# Patient Record
Sex: MALE | Race: WHITE | NOT HISPANIC OR LATINO | Employment: OTHER | URBAN - METROPOLITAN AREA
[De-identification: names, ages, dates, MRNs, and addresses within clinical notes are randomized per-mention and may not be internally consistent; named-entity substitution may affect disease eponyms.]

---

## 2017-07-28 ENCOUNTER — LAB (OUTPATIENT)
Dept: LAB | Facility: HOSPITAL | Age: 62
End: 2017-07-28
Attending: SURGERY

## 2017-07-28 ENCOUNTER — HOSPITAL ENCOUNTER (OUTPATIENT)
Dept: CARDIOLOGY | Facility: HOSPITAL | Age: 62
Discharge: HOME | End: 2017-07-28
Attending: SURGERY | Admitting: SURGERY

## 2017-07-28 ENCOUNTER — TRANSCRIBE ORDERS (OUTPATIENT)
Dept: ADMINISTRATIVE | Facility: HOSPITAL | Age: 62
End: 2017-07-28

## 2017-07-28 DIAGNOSIS — Z01.812 PRE-OPERATIVE LABORATORY EXAMINATION: ICD-10-CM

## 2017-07-28 DIAGNOSIS — Z01.818 PREOP EXAMINATION: ICD-10-CM

## 2017-07-28 DIAGNOSIS — Z01.812 PRE-OPERATIVE LABORATORY EXAMINATION: Primary | ICD-10-CM

## 2017-07-28 LAB
BASOPHILS # BLD AUTO: 0.03 10*3/MM3 (ref 0–0.2)
BASOPHILS NFR BLD AUTO: 0.6 % (ref 0–1.5)
DEPRECATED RDW RBC AUTO: 43.1 FL (ref 37–54)
EOSINOPHIL # BLD AUTO: 0.09 10*3/MM3 (ref 0–0.7)
EOSINOPHIL NFR BLD AUTO: 1.9 % (ref 0.3–6.2)
ERYTHROCYTE [DISTWIDTH] IN BLOOD BY AUTOMATED COUNT: 13 % (ref 11.5–14.5)
HCT VFR BLD AUTO: 42.8 % (ref 40.4–52.2)
HGB BLD-MCNC: 14.3 G/DL (ref 13.7–17.6)
IMM GRANULOCYTES # BLD: 0.01 10*3/MM3 (ref 0–0.03)
IMM GRANULOCYTES NFR BLD: 0.2 % (ref 0–0.5)
LYMPHOCYTES # BLD AUTO: 1.06 10*3/MM3 (ref 0.9–4.8)
LYMPHOCYTES NFR BLD AUTO: 22.2 % (ref 19.6–45.3)
MCH RBC QN AUTO: 30.4 PG (ref 27–32.7)
MCHC RBC AUTO-ENTMCNC: 33.4 G/DL (ref 32.6–36.4)
MCV RBC AUTO: 90.9 FL (ref 79.8–96.2)
MONOCYTES # BLD AUTO: 0.35 10*3/MM3 (ref 0.2–1.2)
MONOCYTES NFR BLD AUTO: 7.3 % (ref 5–12)
NEUTROPHILS # BLD AUTO: 3.23 10*3/MM3 (ref 1.9–8.1)
NEUTROPHILS NFR BLD AUTO: 67.8 % (ref 42.7–76)
NRBC BLD MANUAL-RTO: 0 /100 WBC (ref 0–0)
PLATELET # BLD AUTO: 203 10*3/MM3 (ref 140–500)
PMV BLD AUTO: 10.1 FL (ref 6–12)
RBC # BLD AUTO: 4.71 10*6/MM3 (ref 4.6–6)
WBC NRBC COR # BLD: 4.77 10*3/MM3 (ref 4.5–10.7)

## 2017-07-28 PROCEDURE — 93010 ELECTROCARDIOGRAM REPORT: CPT | Performed by: INTERNAL MEDICINE

## 2017-07-28 PROCEDURE — 93005 ELECTROCARDIOGRAM TRACING: CPT | Performed by: SURGERY

## 2017-07-28 PROCEDURE — 85025 COMPLETE CBC W/AUTO DIFF WBC: CPT

## 2017-07-28 PROCEDURE — 36415 COLL VENOUS BLD VENIPUNCTURE: CPT

## 2017-08-04 ENCOUNTER — LAB REQUISITION (OUTPATIENT)
Dept: LAB | Facility: HOSPITAL | Age: 62
End: 2017-08-04

## 2017-08-04 DIAGNOSIS — I87.2 VENOUS INSUFFICIENCY (CHRONIC) (PERIPHERAL): ICD-10-CM

## 2017-08-04 PROCEDURE — 88304 TISSUE EXAM BY PATHOLOGIST: CPT | Performed by: SURGERY

## 2017-08-07 LAB
CYTO UR: NORMAL
LAB AP CASE REPORT: NORMAL
LAB AP CLINICAL INFORMATION: NORMAL
Lab: NORMAL
PATH REPORT.FINAL DX SPEC: NORMAL
PATH REPORT.GROSS SPEC: NORMAL

## 2021-04-12 NOTE — PROGRESS NOTES
"Chief Complaint  Establish Care    Subjective          Colby Santillan presents to [facility] PRIMARY CARE  History of Present Illness     Previous PCP Aida    Very janneth patient with a previous history of colon cancer and at that time changed to a vegan diet.  He had chemo/surgery after stage 3b colon cancer Dx.  Unable to complete chemo series due to overwhelming side effects.  Due to the cancer, he decided to change his diet to a plant-based diet.  Since being on the plant based diet, his blood pressure issues improved immensely and he says that \"I am still here\".      Needs screening labs.  Not fasting today.  No cholesterol panel since 2015 and I do not see a metabolic panel since 2017.    Objective   Vital Signs:   /78 (BP Location: Left arm, Patient Position: Sitting, Cuff Size: Adult)   Pulse 60   Temp 98.4 °F (36.9 °C) (Temporal)   Resp 16   Ht 190.5 cm (75\")   Wt 81.3 kg (179 lb 3.2 oz)   SpO2 99%   BMI 22.40 kg/m²     Physical Exam  Vitals and nursing note reviewed.   Constitutional:       General: He is not in acute distress.     Appearance: Normal appearance.   Cardiovascular:      Rate and Rhythm: Normal rate and regular rhythm.      Heart sounds: Normal heart sounds.   Pulmonary:      Effort: Pulmonary effort is normal.      Breath sounds: Normal breath sounds.   Neurological:      Mental Status: He is alert.        Result Review :                 Assessment and Plan    Diagnoses and all orders for this visit:    1. Encounter to establish care with new doctor (Primary)  -     CBC (No Diff); Future  -     Comprehensive Metabolic Panel; Future  -     Lipid Panel; Future    2. History of colon cancer, stage III    3. Screening for deficiency anemia  -     CBC (No Diff); Future    4. Screening for diabetes mellitus  -     Comprehensive Metabolic Panel; Future    5. Screening for lipid disorders  -     Lipid Panel; Future      I have ordered him to complete some screening " labs for me and if everything is okay then I will plan to see him back in about 1 year unless something else comes up.    I spent 30 minutes caring for Colby on this date of service. This time includes time spent by me in the following activities:preparing for the visit, reviewing tests, obtaining and/or reviewing a separately obtained history, performing a medically appropriate examination and/or evaluation , counseling and educating the patient/family/caregiver and documenting information in the medical record  Follow Up   Return in about 1 year (around 4/14/2022) for Follow-up.  Patient was given instructions and counseling regarding his condition or for health maintenance advice. Please see specific information pulled into the AVS if appropriate.

## 2021-04-13 ENCOUNTER — OFFICE VISIT (OUTPATIENT)
Dept: INTERNAL MEDICINE | Facility: CLINIC | Age: 66
End: 2021-04-13

## 2021-04-13 VITALS
SYSTOLIC BLOOD PRESSURE: 123 MMHG | DIASTOLIC BLOOD PRESSURE: 78 MMHG | WEIGHT: 179.2 LBS | HEIGHT: 75 IN | OXYGEN SATURATION: 99 % | TEMPERATURE: 98.4 F | HEART RATE: 60 BPM | BODY MASS INDEX: 22.28 KG/M2 | RESPIRATION RATE: 16 BRPM

## 2021-04-13 DIAGNOSIS — Z13.220 SCREENING FOR LIPID DISORDERS: ICD-10-CM

## 2021-04-13 DIAGNOSIS — Z85.038 HISTORY OF COLON CANCER, STAGE III: ICD-10-CM

## 2021-04-13 DIAGNOSIS — Z76.89 ENCOUNTER TO ESTABLISH CARE WITH NEW DOCTOR: Primary | ICD-10-CM

## 2021-04-13 DIAGNOSIS — Z13.0 SCREENING FOR DEFICIENCY ANEMIA: ICD-10-CM

## 2021-04-13 DIAGNOSIS — Z13.1 SCREENING FOR DIABETES MELLITUS: ICD-10-CM

## 2021-04-13 PROBLEM — L73.9 FOLLICULITIS: Status: ACTIVE | Noted: 2021-04-13

## 2021-04-13 PROBLEM — H90.0 CONDUCTIVE HEARING LOSS OF BOTH EARS: Status: ACTIVE | Noted: 2021-04-13

## 2021-04-13 PROCEDURE — 99203 OFFICE O/P NEW LOW 30 MIN: CPT | Performed by: FAMILY MEDICINE

## 2021-04-13 NOTE — PATIENT INSTRUCTIONS
Health Maintenance, Male  Adopting a healthy lifestyle and getting preventive care are important in promoting health and wellness. Ask your health care provider about:  · The right schedule for you to have regular tests and exams.  · Things you can do on your own to prevent diseases and keep yourself healthy.  What should I know about diet, weight, and exercise?  Eat a healthy diet    · Eat a diet that includes plenty of vegetables, fruits, low-fat dairy products, and lean protein.  · Do not eat a lot of foods that are high in solid fats, added sugars, or sodium.  Maintain a healthy weight  Body mass index (BMI) is a measurement that can be used to identify possible weight problems. It estimates body fat based on height and weight. Your health care provider can help determine your BMI and help you achieve or maintain a healthy weight.  Get regular exercise  Get regular exercise. This is one of the most important things you can do for your health. Most adults should:  · Exercise for at least 150 minutes each week. The exercise should increase your heart rate and make you sweat (moderate-intensity exercise).  · Do strengthening exercises at least twice a week. This is in addition to the moderate-intensity exercise.  · Spend less time sitting. Even light physical activity can be beneficial.  Watch cholesterol and blood lipids  Have your blood tested for lipids and cholesterol at 20 years of age, then have this test every 5 years.  You may need to have your cholesterol levels checked more often if:  · Your lipid or cholesterol levels are high.  · You are older than 40 years of age.  · You are at high risk for heart disease.  What should I know about cancer screening?  Many types of cancers can be detected early and may often be prevented. Depending on your health history and family history, you may need to have cancer screening at various ages. This may include screening for:  · Colorectal cancer.  · Prostate  cancer.  · Skin cancer.  · Lung cancer.  What should I know about heart disease, diabetes, and high blood pressure?  Blood pressure and heart disease  · High blood pressure causes heart disease and increases the risk of stroke. This is more likely to develop in people who have high blood pressure readings, are of  descent, or are overweight.  · Talk with your health care provider about your target blood pressure readings.  · Have your blood pressure checked:  ? Every 3-5 years if you are 18-39 years of age.  ? Every year if you are 40 years old or older.  · If you are between the ages of 65 and 75 and are a current or former smoker, ask your health care provider if you should have a one-time screening for abdominal aortic aneurysm (AAA).  Diabetes  Have regular diabetes screenings. This checks your fasting blood sugar level. Have the screening done:  · Once every three years after age 45 if you are at a normal weight and have a low risk for diabetes.  · More often and at a younger age if you are overweight or have a high risk for diabetes.  What should I know about preventing infection?  Hepatitis B  If you have a higher risk for hepatitis B, you should be screened for this virus. Talk with your health care provider to find out if you are at risk for hepatitis B infection.  Hepatitis C  Blood testing is recommended for:  · Everyone born from 1945 through 1965.  · Anyone with known risk factors for hepatitis C.  Sexually transmitted infections (STIs)  · You should be screened each year for STIs, including gonorrhea and chlamydia, if:  ? You are sexually active and are younger than 24 years of age.  ? You are older than 24 years of age and your health care provider tells you that you are at risk for this type of infection.  ? Your sexual activity has changed since you were last screened, and you are at increased risk for chlamydia or gonorrhea. Ask your health care provider if you are at risk.  · Ask your  health care provider about whether you are at high risk for HIV. Your health care provider may recommend a prescription medicine to help prevent HIV infection. If you choose to take medicine to prevent HIV, you should first get tested for HIV. You should then be tested every 3 months for as long as you are taking the medicine.  Follow these instructions at home:  Lifestyle  · Do not use any products that contain nicotine or tobacco, such as cigarettes, e-cigarettes, and chewing tobacco. If you need help quitting, ask your health care provider.  · Do not use street drugs.  · Do not share needles.  · Ask your health care provider for help if you need support or information about quitting drugs.  Alcohol use  · Do not drink alcohol if your health care provider tells you not to drink.  · If you drink alcohol:  ? Limit how much you have to 0-2 drinks a day.  ? Be aware of how much alcohol is in your drink. In the [place] one drink equals one 12 oz bottle of beer (355 mL), one 5 oz glass of wine (148 mL), or one 1½ oz glass of hard liquor (44 mL).  General instructions  · Schedule regular health, dental, and eye exams.  · Stay current with your vaccines.  · Tell your health care provider if:  ? You often feel depressed.  ? You have ever been abused or do not feel safe at home.  Summary  · Adopting a healthy lifestyle and getting preventive care are important in promoting health and wellness.  · Follow your health care provider's instructions about healthy diet, exercising, and getting tested or screened for diseases.  · Follow your health care provider's instructions on monitoring your cholesterol and blood pressure.  This information is not intended to replace advice given to you by your health care provider. Make sure you discuss any questions you have with your health care provider.  Document Revised: 12/11/2019 Document Reviewed: 12/11/2019  Elsevier Patient Education © 2021 [employer]

## 2021-04-19 ENCOUNTER — TELEPHONE (OUTPATIENT)
Dept: INTERNAL MEDICINE | Facility: CLINIC | Age: 66
End: 2021-04-19

## 2021-04-19 NOTE — TELEPHONE ENCOUNTER
Caller: Colby Santillan    Relationship to patient: Self    Best call back number: [phone]    Patient is needing: PATIENT NEEDS TO RESCHEDULE LAB WORK. UNABLE TO WARM TRANSFER.

## 2022-03-22 ENCOUNTER — TELEPHONE (OUTPATIENT)
Dept: INTERNAL MEDICINE | Facility: CLINIC | Age: 67
End: 2022-03-22

## 2022-03-22 NOTE — TELEPHONE ENCOUNTER
Caller: Colby Santillan    Relationship: Self    Best call back number: [phone]     What is the medical concern/diagnosis: EARLY ONSET PARKINSON'S     What specialty or service is being requested: NEUROTRANSMITTER THERAPY    Any additional details: PATIENT WANTED TO KNOW WHO DR. RODRIGUEZ WOULD RECOMMEND FOR NEUROTRANSMITTER THERAPY - HE BELIEVES HE MAY HAVE EARLY ONSET OF PARKINSON'S AND HE WANTED TO SEE WHO HE CAN CONTACT.

## 2022-03-22 NOTE — TELEPHONE ENCOUNTER
I have only seen him one time in April 2021.  We did not discuss anything about this issue.  He would have to be seen to discuss this matter before any referrals can be made or advice given.

## 2022-03-26 NOTE — PROGRESS NOTES
"Chief Complaint  Annual Exam    Subjective            Colby Santillan presents to [facility] PRIMARY CARE  History of Present Illness    CPE- Patient reporting that health is doing well overall.  Patient is here today for annual physical.  Eating a balanced diet.  Wears hearing aids for bilateral hearing loss.    Labs: Due for routine labs    Colorectal cancer screening: UTD    Additional complaints:  He is concerned about having some tremors and he will have a fine tremor especially when performing an action.  It is in both hands.  He will also see it sometimes in lip as well.  Trying to relax seems to help.  Stress tends to make it worse.  His brother also tremors as well and father.    He has noticed some urinary frequency as well.  He has tried to avoid drinking fluids too close to bedtime.  Denies alcohol use.  Gets up about twice per night and rarely more than that. Having some broken stream but not straining too much.  He has had prostate exams before which have not been abnormal.    Review of Systems      Objective   Vital Signs:   /78 (BP Location: Left arm, Patient Position: Sitting, Cuff Size: Adult)   Pulse 60   Temp 99.1 °F (37.3 °C) (Temporal)   Ht 190.5 cm (75\")   Wt 80.6 kg (177 lb 12.8 oz)   SpO2 99%   BMI 22.22 kg/m²     Physical Exam  Vitals and nursing note reviewed.   Constitutional:       General: He is not in acute distress.     Appearance: Normal appearance.   HENT:      Right Ear: Tympanic membrane, ear canal and external ear normal.      Left Ear: Tympanic membrane, ear canal and external ear normal.      Nose: Nose normal.      Mouth/Throat:      Mouth: Mucous membranes are moist.   Eyes:      General:         Right eye: No discharge.         Left eye: No discharge.      Conjunctiva/sclera: Conjunctivae normal.   Cardiovascular:      Rate and Rhythm: Normal rate and regular rhythm.      Pulses: Normal pulses.      Heart sounds: Normal heart sounds.   Pulmonary: "      Effort: Pulmonary effort is normal.      Breath sounds: Normal breath sounds.   Abdominal:      General: Bowel sounds are normal. There is no distension.      Palpations: Abdomen is soft.      Tenderness: There is no abdominal tenderness.   Musculoskeletal:      Cervical back: Neck supple.      Right lower leg: No edema.      Left lower leg: No edema.   Lymphadenopathy:      Cervical: No cervical adenopathy.   Skin:     General: Skin is warm.      Findings: No rash.   Neurological:      General: No focal deficit present.      Mental Status: He is alert. Mental status is at baseline.      Cranial Nerves: Cranial nerves are intact.      Sensory: Sensation is intact.      Motor: Motor function is intact.      Gait: Gait is intact.      Comments: Fine tremor noticed with outstretched hands and slow retraction of the tongue into the mouth   Psychiatric:         Mood and Affect: Mood normal.         Behavior: Behavior normal.        Result Review :   The following data was reviewed by: Nikhil Salvador MD on 03/28/2022:  Common labs    Common Labsle 4/23/21 4/23/21 4/23/21    0918 0918 0918   Glucose  85    BUN  10    Creatinine  0.71 (A)    eGFR Non  Am  112    eGFR African Am  135    Sodium  139    Potassium  4.5    Chloride  103    Calcium  9.9    Total Protein  6.7    Albumin  4.40    Total Bilirubin  0.7    Alkaline Phosphatase  83    AST (SGOT)  15    ALT (SGPT)  13    WBC 4.79     Hemoglobin 14.2     Hematocrit 42.2     Platelets 192     Total Cholesterol   163   Triglycerides   91   HDL Cholesterol   52   LDL Cholesterol    94   (A) Abnormal value       Comments are available for some flowsheets but are not being displayed.                     Assessment and Plan    Diagnoses and all orders for this visit:    1. Encounter for health maintenance examination (Primary)  -     CBC (No Diff)  -     Comprehensive Metabolic Panel  -     Lipid Panel With LDL / HDL Ratio  -     PSA Screen  -     TSH Rfx On  Abnormal To Free T4    2. Screening for deficiency anemia  -     CBC (No Diff)    3. Screening for diabetes mellitus  -     Comprehensive Metabolic Panel    4. Screening for lipid disorders  -     Lipid Panel With LDL / HDL Ratio    5. Screening for prostate cancer  -     PSA Screen    6. Screening for thyroid disorder  -     TSH Rfx On Abnormal To Free T4    7. Benign essential tremor    8. Urinary frequency  -     tamsulosin (FLOMAX) 0.4 MG capsule 24 hr capsule; Take 1 capsule by mouth Daily.  Dispense: 30 capsule; Refill: 2      Additional codes:  Chronic issues but new to me  Essential tremor - will monitor for now.  No medication necessary at this time as this tremor is not adversely affecting his ability in his daily routine.  Decrease stimulant use and deep breathing.  We can always revisit medication in the future.    Urinary frequency - History consistent with BPH and will get PSA today.  Start tamsulosin 0.4mg nightly.  Father has a hx of prostate cancer.              The patient was counseled regarding nutrition, physical activity, healthy weight, injury prevention, misuse of tobacco, alcohol and illicit drugs, mental health, immunizations, and screenings.    Recommended to check with pharmacy regarding newer pneumo vaccines.  Declines flu vaccines so d/c'd them.  Will hold on other vaccines as I asked him to investigate getting the newer pneumo vaccines.    Follow Up   Return in about 1 year (around 3/30/2023) for Annual physical.  Patient was given instructions and counseling regarding his condition or for health maintenance advice. Please see specific information pulled into the AVS if appropriate.

## 2022-03-28 ENCOUNTER — OFFICE VISIT (OUTPATIENT)
Dept: INTERNAL MEDICINE | Facility: CLINIC | Age: 67
End: 2022-03-28

## 2022-03-28 VITALS
HEIGHT: 75 IN | TEMPERATURE: 99.1 F | OXYGEN SATURATION: 99 % | WEIGHT: 177.8 LBS | HEART RATE: 60 BPM | SYSTOLIC BLOOD PRESSURE: 128 MMHG | BODY MASS INDEX: 22.11 KG/M2 | DIASTOLIC BLOOD PRESSURE: 78 MMHG

## 2022-03-28 DIAGNOSIS — Z13.220 SCREENING FOR LIPID DISORDERS: ICD-10-CM

## 2022-03-28 DIAGNOSIS — G25.0 BENIGN ESSENTIAL TREMOR: ICD-10-CM

## 2022-03-28 DIAGNOSIS — R35.0 URINARY FREQUENCY: ICD-10-CM

## 2022-03-28 DIAGNOSIS — Z13.0 SCREENING FOR DEFICIENCY ANEMIA: ICD-10-CM

## 2022-03-28 DIAGNOSIS — Z00.00 ENCOUNTER FOR HEALTH MAINTENANCE EXAMINATION: Primary | ICD-10-CM

## 2022-03-28 DIAGNOSIS — Z12.5 SCREENING FOR PROSTATE CANCER: ICD-10-CM

## 2022-03-28 DIAGNOSIS — Z13.29 SCREENING FOR THYROID DISORDER: ICD-10-CM

## 2022-03-28 DIAGNOSIS — Z13.1 SCREENING FOR DIABETES MELLITUS: ICD-10-CM

## 2022-03-28 PROCEDURE — 99397 PER PM REEVAL EST PAT 65+ YR: CPT | Performed by: FAMILY MEDICINE

## 2022-03-28 PROCEDURE — 99214 OFFICE O/P EST MOD 30 MIN: CPT | Performed by: FAMILY MEDICINE

## 2022-03-28 RX ORDER — TAMSULOSIN HYDROCHLORIDE 0.4 MG/1
1 CAPSULE ORAL DAILY
Qty: 30 CAPSULE | Refills: 2 | Status: SHIPPED | OUTPATIENT
Start: 2022-03-28 | End: 2023-01-09

## 2022-03-30 ENCOUNTER — TELEPHONE (OUTPATIENT)
Dept: INTERNAL MEDICINE | Facility: CLINIC | Age: 67
End: 2022-03-30

## 2022-03-30 NOTE — TELEPHONE ENCOUNTER
Caller: Colby Santillan    Relationship to patient: Self    Best call back number: [phone]    Chief complaint:     Type of visit: LAB     Requested date:     If rescheduling, when is the original appointment: 3-30-22     Additional notes:

## 2022-04-05 LAB
ALBUMIN SERPL-MCNC: 4.3 G/DL (ref 3.8–4.8)
ALBUMIN/GLOB SERPL: 2 {RATIO} (ref 1.2–2.2)
ALP SERPL-CCNC: 105 IU/L (ref 44–121)
ALT SERPL-CCNC: 11 IU/L (ref 0–44)
AST SERPL-CCNC: 19 IU/L (ref 0–40)
BILIRUB SERPL-MCNC: 0.8 MG/DL (ref 0–1.2)
BUN SERPL-MCNC: 10 MG/DL (ref 8–27)
BUN/CREAT SERPL: 14 (ref 10–24)
CALCIUM SERPL-MCNC: 9.6 MG/DL (ref 8.6–10.2)
CHLORIDE SERPL-SCNC: 103 MMOL/L (ref 96–106)
CHOLEST SERPL-MCNC: 147 MG/DL (ref 100–199)
CO2 SERPL-SCNC: 25 MMOL/L (ref 20–29)
CREAT SERPL-MCNC: 0.72 MG/DL (ref 0.76–1.27)
EGFRCR SERPLBLD CKD-EPI 2021: 101 ML/MIN/1.73
ERYTHROCYTE [DISTWIDTH] IN BLOOD BY AUTOMATED COUNT: 12.4 % (ref 11.6–15.4)
GLOBULIN SER CALC-MCNC: 2.1 G/DL (ref 1.5–4.5)
GLUCOSE SERPL-MCNC: 86 MG/DL (ref 65–99)
HCT VFR BLD AUTO: 41 % (ref 37.5–51)
HDLC SERPL-MCNC: 50 MG/DL
HGB BLD-MCNC: 13.6 G/DL (ref 13–17.7)
LDLC SERPL CALC-MCNC: 86 MG/DL (ref 0–99)
LDLC/HDLC SERPL: 1.7 RATIO (ref 0–3.6)
MCH RBC QN AUTO: 30.6 PG (ref 26.6–33)
MCHC RBC AUTO-ENTMCNC: 33.2 G/DL (ref 31.5–35.7)
MCV RBC AUTO: 92 FL (ref 79–97)
PLATELET # BLD AUTO: 208 X10E3/UL (ref 150–450)
POTASSIUM SERPL-SCNC: 4.5 MMOL/L (ref 3.5–5.2)
PROT SERPL-MCNC: 6.4 G/DL (ref 6–8.5)
PSA SERPL-MCNC: 2.8 NG/ML (ref 0–4)
RBC # BLD AUTO: 4.44 X10E6/UL (ref 4.14–5.8)
SODIUM SERPL-SCNC: 139 MMOL/L (ref 134–144)
TRIGL SERPL-MCNC: 48 MG/DL (ref 0–149)
TSH SERPL DL<=0.005 MIU/L-ACNC: 1.96 UIU/ML (ref 0.45–4.5)
VLDLC SERPL CALC-MCNC: 11 MG/DL (ref 5–40)
WBC # BLD AUTO: 4.8 X10E3/UL (ref 3.4–10.8)

## 2022-07-09 ENCOUNTER — DOCUMENTATION (OUTPATIENT)
Dept: INTERNAL MEDICINE | Facility: CLINIC | Age: 67
End: 2022-07-09

## 2022-07-09 NOTE — PROGRESS NOTES
Patient followed over the weekend and stated that he had an embedded tick that he just found and removed. He did state there was redness around the area and a blackened area in the middle. Patient denies any myalgias, fever, chills at this time. Patient was instructed to go to the urgent care now for further evaluation and treatment.

## 2022-09-21 ENCOUNTER — TELEPHONE (OUTPATIENT)
Dept: INTERNAL MEDICINE | Facility: CLINIC | Age: 67
End: 2022-09-21

## 2022-09-21 NOTE — TELEPHONE ENCOUNTER
Caller: Colby Santillan    Relationship: Self    Best call back number:[phone]    What is the medical concern/diagnosis:    What specialty or service is being requested: URIOLOGY    What is the provider, practice or medical service name:     What is the office location:     What is the office phone number:     Any additional details: PLEASE CALL AND ADVISE

## 2022-10-03 NOTE — TELEPHONE ENCOUNTER
Caller: Colby Santillan    Relationship: Self    Best call back number: [phone]    What was the call regarding: PATIENT STATES THAT HIS INSURANCE DOES NOT REQUIRE A REFERRAL, AND HE WOULD SIMPLY LIKE THE RECOMMENDATION OF AN UROLOGIST THAT DR RODRIGUEZ SUGGESTS    Do you require a callback: YES

## 2023-01-09 ENCOUNTER — OFFICE VISIT (OUTPATIENT)
Dept: INTERNAL MEDICINE | Facility: CLINIC | Age: 68
End: 2023-01-09
Payer: COMMERCIAL

## 2023-01-09 VITALS
SYSTOLIC BLOOD PRESSURE: 110 MMHG | BODY MASS INDEX: 22.01 KG/M2 | TEMPERATURE: 97.6 F | HEART RATE: 68 BPM | OXYGEN SATURATION: 95 % | HEIGHT: 75 IN | RESPIRATION RATE: 18 BRPM | DIASTOLIC BLOOD PRESSURE: 60 MMHG | WEIGHT: 177 LBS

## 2023-01-09 DIAGNOSIS — Z00.00 WELCOME TO MEDICARE PREVENTIVE VISIT: Primary | ICD-10-CM

## 2023-01-09 PROBLEM — Z85.828 HISTORY OF SKIN CANCER: Chronic | Status: ACTIVE | Noted: 2023-01-09

## 2023-01-09 PROCEDURE — 1125F AMNT PAIN NOTED PAIN PRSNT: CPT | Performed by: FAMILY MEDICINE

## 2023-01-09 PROCEDURE — 1170F FXNL STATUS ASSESSED: CPT | Performed by: FAMILY MEDICINE

## 2023-01-09 PROCEDURE — 1126F AMNT PAIN NOTED NONE PRSNT: CPT | Performed by: FAMILY MEDICINE

## 2023-01-09 PROCEDURE — 1160F RVW MEDS BY RX/DR IN RCRD: CPT | Performed by: FAMILY MEDICINE

## 2023-01-09 PROCEDURE — G0403 EKG FOR INITIAL PREVENT EXAM: HCPCS | Performed by: FAMILY MEDICINE

## 2023-01-09 PROCEDURE — G0402 INITIAL PREVENTIVE EXAM: HCPCS | Performed by: FAMILY MEDICINE

## 2023-01-09 PROCEDURE — 1159F MED LIST DOCD IN RCRD: CPT | Performed by: FAMILY MEDICINE

## 2023-01-09 NOTE — PROGRESS NOTES
The ABCs of the Annual Wellness Visit  [facility]    Subjective     Colby Santillan is a 67 y.o. male who presents for a  Welcome to Medicare Visit.    The following portions of the patient's history were reviewed and   updated as appropriate: allergies, current medications, past family history, past medical history, past social history, past surgical history and problem list.     Compared to one year ago, the patient feels his physical   health is the same.    Compared to one year ago, the patient feels his mental   health is the same.    Recent Hospitalizations:  He was not admitted to the hospital during the last year.       Current Medical Providers:  Patient Care Team:  Nikhil Salvador MD as PCP - General (Family Medicine)    Outpatient Medications Prior to Visit   Medication Sig Dispense Refill   • doxycycline (VIBRAMYCIN) 100 MG capsule 2 po now 2 capsule 0   • tamsulosin (FLOMAX) 0.4 MG capsule 24 hr capsule Take 1 capsule by mouth Daily. 30 capsule 2     No facility-administered medications prior to visit.       No opioid medication identified on active medication list. I have reviewed chart for other potential  high risk medication/s and harmful drug interactions in the elderly.          Aspirin is not on active medication list.  Aspirin use is not indicated based on review of current medical condition/s. Risk of harm outweighs potential benefits.  .    Patient Active Problem List   Diagnosis   • Conductive hearing loss of both ears   • Folliculitis   • History of colon cancer, stage III   • Urinary frequency   • Benign essential tremor   • History of skin cancer     Advance Care Planning  Advance Directive is not on file.  ACP discussion was held with the patient during this visit. Patient has an advance directive (not in EMR), copy requested.       Objective   Vitals:    01/09/23 1338   BP: 110/60   BP Location: Left arm   Patient Position: Sitting   Cuff Size: Small Adult   Pulse: 68    Resp: 18   Temp: 97.6 °F (36.4 °C)   SpO2: 95%   Weight: 80.3 kg (177 lb)   Height: 190.5 cm (75\")   PainSc: 0-No pain     Estimated body mass index is 22.12 kg/m² as calculated from the following:    Height as of this encounter: 190.5 cm (75\").    Weight as of this encounter: 80.3 kg (177 lb).    BMI is within normal parameters. No other follow-up for BMI required.    Physical Exam  Vitals and nursing note reviewed.   Constitutional:       General: He is not in acute distress.     Appearance: Normal appearance.   Cardiovascular:      Rate and Rhythm: Normal rate and regular rhythm.      Heart sounds: Normal heart sounds. No murmur heard.  Pulmonary:      Effort: Pulmonary effort is normal.      Breath sounds: Normal breath sounds.   Neurological:      Mental Status: He is alert.           Does the patient have evidence of cognitive impairment?   No         Procedures       HEALTH RISK ASSESSMENT    Smoking Status:  Social History     Tobacco Use   Smoking Status Never   Smokeless Tobacco Never     Alcohol Consumption:  Social History     Substance and Sexual Activity   Alcohol Use Yes   • Alcohol/week: 1.0 standard drink   • Types: 1 Cans of beer per week       Fall Risk Screen:    [facility] Fall Risk Assessment was completed, and patient is at LOW risk for falls.Assessment completed on:1/9/2023    Depression Screen:   PHQ-2/PHQ-9 Depression Screening 1/9/2023   Little Interest or Pleasure in Doing Things 0-->not at all   Feeling Down, Depressed or Hopeless 0-->not at all   PHQ-9: Brief Depression Severity Measure Score 0       Health Habits and Functional and Cognitive Screening:  Functional & Cognitive Status 1/9/2023   Do you have difficulty preparing food and eating? No   Do you have difficulty bathing yourself, getting dressed or grooming yourself? No   Do you have difficulty using the toilet? No   Do you have difficulty moving around from place to place? No   Do you have trouble with steps or getting out of a  bed or a chair? No   Current Diet Well Balanced Diet   Dental Exam Up to date   Eye Exam Not up to date   Exercise (times per week) 4 times per week   Current Exercises Include Walking;Weightlifting   Do you need help using the phone?  No   Are you deaf or do you have serious difficulty hearing?  Yes   Do you need help with transportation? No   Do you need help shopping? No   Do you need help preparing meals?  No   Do you need help with housework?  No   Do you need help with laundry? No   Do you need help taking your medications? No   Do you need help managing money? No   Do you ever drive or ride in a car without wearing a seat belt? No   Have you felt unusual stress, anger or loneliness in the last month? No   Who do you live with? Spouse   If you need help, do you have trouble finding someone available to you? No   Have you been bothered in the last four weeks by sexual problems? No   Do you have difficulty concentrating, remembering or making decisions? No       Visual Acuity:    No results found.   He has an eye doctor appointment in a couple months so deferred for today.    Age-appropriate Screening Schedule:  Refer to the list below for future screening recommendations based on patient's age, sex and/or medical conditions. Orders for these recommended tests are listed in the plan section. The patient has been provided with a written plan.    Health Maintenance   Topic Date Due   • ZOSTER VACCINE (1 of 2) Never done   • TDAP/TD VACCINES (2 - Tdap) 07/09/2032   • INFLUENZA VACCINE  Discontinued          Common labs    Common Labs 4/4/22 4/4/22 4/4/22 4/4/22    0902 0902 0902 0902   Glucose  86     BUN  10     Creatinine  0.72 (A)     Sodium  139     Potassium  4.5     Chloride  103     Calcium  9.6     Total Protein  6.4     Albumin  4.3     Total Bilirubin  0.8     Alkaline Phosphatase  105     AST (SGOT)  19     ALT (SGPT)  11     WBC 4.8      Hemoglobin 13.6      Hematocrit 41.0      Platelets 208      Total  Cholesterol   147    Triglycerides   48    HDL Cholesterol   50    LDL Cholesterol    86    PSA    2.8   (A) Abnormal value       Comments are available for some flowsheets but are not being displayed.              Adult ECG Report     Name: Colby Santillan   Age: 67 y.o.   Gender: male    I personally interpreted this EKG and compared to the one from July 28, 2017.     Rate: 60   Rhythm: normal sinus rhythm   QRS Axis: Normal   MS Interval: 160   QRS Duration: 90   QTc: 396   Voltages: 10   Conduction Disturbances: none   Other Abnormalities: none     Narrative Interpretation: Normal sinus rhythm, normal ECG        CMS Preventative Services Quick Reference  Risk Factors Identified During Encounter    Immunizations Discussed/Encouraged: Influenza, Prevnar 20 (Pneumococcal 20-valent conjugate), Shingrix and COVID19  The above risks/problems have been discussed with the patient.  Pertinent information has been shared with the patient in the After Visit Summary.  Follow up plans and orders are seen below in the Assessment/Plan Section.    Diagnoses and all orders for this visit:    1. Welcome to Medicare preventive visit (Primary)  -     ECG 12 Lead        Follow Up:   Return in about 3 months (around 4/20/2023) for Annual physical.      An After Visit Summary and PPPS were made available to the patient.

## 2023-02-02 ENCOUNTER — TELEPHONE (OUTPATIENT)
Dept: INTERNAL MEDICINE | Facility: CLINIC | Age: 68
End: 2023-02-02

## 2023-02-02 DIAGNOSIS — R35.0 BENIGN PROSTATIC HYPERPLASIA WITH URINARY FREQUENCY: ICD-10-CM

## 2023-02-02 DIAGNOSIS — N40.1 BENIGN PROSTATIC HYPERPLASIA WITH URINARY FREQUENCY: ICD-10-CM

## 2023-02-02 DIAGNOSIS — R35.0 URINARY FREQUENCY: Primary | ICD-10-CM

## 2023-02-02 NOTE — TELEPHONE ENCOUNTER
Caller: Colby Santillan    Relationship: Self    Best call back number: [phone]    What is the medical concern/diagnosis: ENLARGED PROSTATE    What specialty or service is being requested: UROLOGY    What is the provider, practice or medical service name: DR. TEA GANDHI, [facility] UROLOGY    Any additional details: PLEASE ADVISE.

## 2023-03-31 ENCOUNTER — OFFICE VISIT (OUTPATIENT)
Dept: INTERNAL MEDICINE | Facility: CLINIC | Age: 68
End: 2023-03-31
Payer: MEDICARE

## 2023-03-31 VITALS
TEMPERATURE: 97.5 F | SYSTOLIC BLOOD PRESSURE: 120 MMHG | HEART RATE: 72 BPM | HEIGHT: 75 IN | WEIGHT: 180 LBS | OXYGEN SATURATION: 96 % | DIASTOLIC BLOOD PRESSURE: 74 MMHG | BODY MASS INDEX: 22.38 KG/M2

## 2023-03-31 DIAGNOSIS — R25.1 TREMOR: Primary | ICD-10-CM

## 2023-03-31 DIAGNOSIS — R19.4 CHANGE IN BOWEL HABIT: Primary | ICD-10-CM

## 2023-03-31 DIAGNOSIS — R19.4 CHANGE IN BOWEL HABIT: ICD-10-CM

## 2023-03-31 NOTE — PROGRESS NOTES
"Chief Complaint  Tremors (Face & Body ) and Hard Stool     Subjective        Colby Santillan presents to [facility] PRIMARY [facility]ARE  History of Present Illness  This is a 68 y/o male presenting to office for complaints of tremor in left hand, constipation. Patient reports over the past year, he has also experienced some facial tremors-- reports he is not sure if it's visible but he does feel it. Patient reports he has noticed his left hand has gotten progressively worse. Patient reports he has been experiencing some tremors in other muscular regions including his chest. Patient reports over the past year, he has experienced some change in stools. Patient reports he has always followed a plant based diet and never had problems with going to the bathroom. Patient is now experiencing a lot of difficulty with constipation. Patient reports that his tremor in his left hand is intentional. Does not notice much tremor in right hand. Reports he stopped drinking caffeine about 2 weeks ago. Patient reports very little alcohol use-- reports 1 rare beer a week. Patient denies any recent high stressors besides worrying about his current situation.     Objective   Vital Signs:  /74 (BP Location: Right arm, Patient Position: Sitting, Cuff Size: Adult)   Pulse 72   Temp 97.5 °F (36.4 °C) (Infrared)   Ht 190.5 cm (75\")   Wt 81.6 kg (180 lb)   SpO2 96%   BMI 22.50 kg/m²   Estimated body mass index is 22.5 kg/m² as calculated from the following:    Height as of this encounter: 190.5 cm (75\").    Weight as of this encounter: 81.6 kg (180 lb).       BMI is within normal parameters. No other follow-up for BMI required.      Physical Exam  Constitutional:       Appearance: Normal appearance.   HENT:      Head: Normocephalic and atraumatic.      Right Ear: External ear normal.      Left Ear: External ear normal.   Eyes:      Conjunctiva/sclera: Conjunctivae normal.      Pupils: Pupils are equal, round, and reactive " to light.   Cardiovascular:      Rate and Rhythm: Normal rate and regular rhythm.      Pulses: Normal pulses.      Heart sounds: Normal heart sounds. No murmur heard.    No friction rub. No gallop.   Pulmonary:      Effort: Pulmonary effort is normal. No respiratory distress.      Breath sounds: Normal breath sounds. No stridor. No wheezing, rhonchi or rales.   Musculoskeletal:      Cervical back: Normal range of motion and neck supple.   Skin:     General: Skin is warm and dry.   Neurological:      Mental Status: He is alert and oriented to person, place, and time. Mental status is at baseline.      Motor: Tremor present.      Gait: Gait normal.   Psychiatric:         Mood and Affect: Mood normal.         Thought Content: Thought content normal.        Result Review :  The following data was reviewed by: JAMES Romero on 03/31/2023:                   Assessment and Plan   Diagnoses and all orders for this visit:    1. Tremor (Primary)  Assessment & Plan:  Labs today.   Referral placed to neurology.     Orders:  -     TSH  -     T4, free  -     T3, free  -     Hemoglobin A1c  -     Vitamin B12  -     Ambulatory Referral to Neurology  -     Comprehensive metabolic panel  -     Lyme Disease Total Antibody With Reflex to Immunoassay    2. Change in bowel habit  Assessment & Plan:  Due to hx of colon cancer-- referral placed to GI services.   Patient has hx of colon cancer with surgical resection    Orders:  -     Ambulatory Referral to Gastroenterology         I spent 30 minutes caring for Colby on this date of service. This time includes time spent by me in the following activities:preparing for the visit, reviewing tests, obtaining and/or reviewing a separately obtained history, performing a medically appropriate examination and/or evaluation , counseling and educating the patient/family/caregiver, ordering medications, tests, or procedures, documenting information in the medical record and care  coordination  Follow Up   Return if symptoms worsen or fail to improve, for Next scheduled follow up 6/26/23.  Patient was given instructions and counseling regarding his condition or for health maintenance advice. Please see specific information pulled into the AVS if appropriate.

## 2023-03-31 NOTE — ASSESSMENT & PLAN NOTE
Due to hx of colon cancer-- referral placed to GI services.   Patient has hx of colon cancer with surgical resection

## 2023-04-01 LAB
ALBUMIN SERPL-MCNC: 4.5 G/DL (ref 3.5–5.2)
ALBUMIN/GLOB SERPL: 1.8 G/DL
ALP SERPL-CCNC: 119 U/L (ref 39–117)
ALT SERPL-CCNC: 16 U/L (ref 1–41)
AST SERPL-CCNC: 16 U/L (ref 1–40)
B BURGDOR IGG+IGM SER QL IA: NEGATIVE
BILIRUB SERPL-MCNC: 0.7 MG/DL (ref 0–1.2)
BUN SERPL-MCNC: 12 MG/DL (ref 8–23)
BUN/CREAT SERPL: 14.3 (ref 7–25)
CALCIUM SERPL-MCNC: 10.4 MG/DL (ref 8.6–10.5)
CHLORIDE SERPL-SCNC: 105 MMOL/L (ref 98–107)
CO2 SERPL-SCNC: 22.2 MMOL/L (ref 22–29)
CREAT SERPL-MCNC: 0.84 MG/DL (ref 0.76–1.27)
EGFRCR SERPLBLD CKD-EPI 2021: 96.2 ML/MIN/1.73
GLOBULIN SER CALC-MCNC: 2.5 GM/DL
GLUCOSE SERPL-MCNC: 91 MG/DL (ref 65–99)
HBA1C MFR BLD: 5.1 % (ref 4.8–5.6)
POTASSIUM SERPL-SCNC: 5.1 MMOL/L (ref 3.5–5.2)
PROT SERPL-MCNC: 7 G/DL (ref 6–8.5)
SODIUM SERPL-SCNC: 143 MMOL/L (ref 136–145)
T3FREE SERPL-MCNC: 3.2 PG/ML (ref 2–4.4)
T4 FREE SERPL-MCNC: 1.09 NG/DL (ref 0.93–1.7)
TSH SERPL DL<=0.005 MIU/L-ACNC: 1.51 UIU/ML (ref 0.27–4.2)
VIT B12 SERPL-MCNC: 266 PG/ML (ref 211–946)

## 2023-04-03 DIAGNOSIS — E53.8 B12 DEFICIENCY: Primary | ICD-10-CM

## 2023-04-10 ENCOUNTER — TELEPHONE (OUTPATIENT)
Dept: INTERNAL MEDICINE | Facility: CLINIC | Age: 68
End: 2023-04-10

## 2023-04-10 NOTE — TELEPHONE ENCOUNTER
Caller: Colby Santillan    Relationship to patient: Self    Best call back number: [phone]    Patient is needing: PATIENT STATES THAT HE WOULD LIKE JAMES TIRADO TO CALL HIM BACK BECAUSE HE HAS SOME QUESTIONS ABOUT RECENT VISIT THAT HE WOULD LIKE TO FOLLOW UP ON       PATIENT STATES THAT  JAMES TIRADO CAN CALL HIM AT HER CONVENIENCE.

## 2023-04-11 ENCOUNTER — TELEPHONE (OUTPATIENT)
Dept: INTERNAL MEDICINE | Facility: CLINIC | Age: 68
End: 2023-04-11

## 2023-04-11 NOTE — TELEPHONE ENCOUNTER
Asking about your recommendation for coloscopy and wants to know if you want him to take b12 even tho labs was in range

## 2023-04-11 NOTE — TELEPHONE ENCOUNTER
Hub to say       Gen surgery should be reaching out-- he had requested to see dr roman, I had placed the referral.     With his b12 being borderline, he can benefit from taking it. Further studies have shown having a b12 level above 400 is recommended.

## 2023-04-11 NOTE — TELEPHONE ENCOUNTER
Try mag citrate at the pharmacy.  If not improving at all over the next few days, go to ER to be evaluated for possible obstruction.

## 2023-04-11 NOTE — TELEPHONE ENCOUNTER
"Patient has been having hard stools for a few months. Last Tuesday he felt constipated and took OTC meds that \"wiped him out within 24 hours\" possibly dulcolax but he is not at home to confirn. He resumed eating on Thursday 4/6/23 and has not been able to go to the bathroom since.     He also shared that he started an herbal supplement for the last 3 days but does not know the name.     He denies fevers, abd pain, cramping  He currently has abdominal \"fullness\"  He is still able to work and continue ADLs normally.    Please advise  "

## 2023-04-11 NOTE — TELEPHONE ENCOUNTER
Caller: Colby Santillan    Relationship: Self    Best call back number: [phone]    What medication are you requesting: LAXATIVE     What are your current symptoms: SEVERE CONSTIPATION COMPLETE BLOCKAGE     How long have you been experiencing symptoms: 6 DAYS     Have you had these symptoms before:    [x] Yes  [] No    Have you been treated for these symptoms before:   [x] Yes  [] No    If a prescription is needed, what is your preferred pharmacy and phone number: [id number] - [place], [place] - [address] [phone] [facility] [phone] [place]     Additional notes:

## 2023-04-12 ENCOUNTER — TELEPHONE (OUTPATIENT)
Dept: INTERNAL MEDICINE | Facility: CLINIC | Age: 68
End: 2023-04-12
Payer: MEDICARE

## 2023-04-12 DIAGNOSIS — R19.4 CHANGE IN BOWEL HABIT: Primary | ICD-10-CM

## 2023-04-12 NOTE — TELEPHONE ENCOUNTER
Patient has called in regards to medication on his irregular bowel issue and as well to the referral to gastrologist. It will be another month before getting into that office, suggeating referral go to a few others and he can call to see if he can get in ASAP. He states that he eat vegan, a lot of fiber in diet and exercise. Concerned because his bowel movements went from 4-5 a day to none. Got relief with oc medication last week and last night with an enema due to not going in 6 days. Please advise.  I did inform him of Dr Salvador message

## 2023-04-12 NOTE — TELEPHONE ENCOUNTER
I have not seen him for this issue.  Cathleen saw him for some of these complaints on 3/31.  I would suggest a follow-up with her or check with her to see if she has thoughts about it.

## 2023-04-12 NOTE — TELEPHONE ENCOUNTER
He can try miralax OTC-- take 1 scoop daily.   I can place a referral into gastro for patient to be seen for further eval r/t change in bowel habits and need for diagnostic colonoscopy.

## 2023-04-16 ENCOUNTER — APPOINTMENT (OUTPATIENT)
Dept: CT IMAGING | Facility: HOSPITAL | Age: 68
End: 2023-04-16
Payer: MEDICARE

## 2023-04-16 ENCOUNTER — HOSPITAL ENCOUNTER (EMERGENCY)
Facility: HOSPITAL | Age: 68
Discharge: HOME | End: 2023-04-16
Attending: EMERGENCY MEDICINE | Admitting: EMERGENCY MEDICINE
Payer: MEDICARE

## 2023-04-16 VITALS
BODY MASS INDEX: 22.38 KG/M2 | SYSTOLIC BLOOD PRESSURE: 124 MMHG | WEIGHT: 180 LBS | TEMPERATURE: 96.2 F | DIASTOLIC BLOOD PRESSURE: 78 MMHG | HEART RATE: 71 BPM | HEIGHT: 75 IN | OXYGEN SATURATION: 98 % | RESPIRATION RATE: 16 BRPM

## 2023-04-16 DIAGNOSIS — R10.9 ACUTE ABDOMINAL PAIN: Primary | ICD-10-CM

## 2023-04-16 DIAGNOSIS — K59.00 CONSTIPATION, UNSPECIFIED CONSTIPATION TYPE: ICD-10-CM

## 2023-04-16 LAB
ALBUMIN SERPL-MCNC: 4.7 G/DL (ref 3.5–5.2)
ALBUMIN/GLOB SERPL: 2 G/DL
ALP SERPL-CCNC: 106 U/L (ref 39–117)
ALT SERPL W P-5'-P-CCNC: 17 U/L (ref 1–41)
ANION GAP SERPL CALCULATED.3IONS-SCNC: 7.6 MMOL/L (ref 5–15)
AST SERPL-CCNC: 19 U/L (ref 1–40)
BASOPHILS # BLD AUTO: 0.03 10*3/MM3 (ref 0–0.2)
BASOPHILS NFR BLD AUTO: 0.4 % (ref 0–1.5)
BILIRUB SERPL-MCNC: 0.4 MG/DL (ref 0–1.2)
BUN SERPL-MCNC: 10 MG/DL (ref 8–23)
BUN/CREAT SERPL: 12.7 (ref 7–25)
CALCIUM SPEC-SCNC: 10.1 MG/DL (ref 8.6–10.5)
CHLORIDE SERPL-SCNC: 105 MMOL/L (ref 98–107)
CO2 SERPL-SCNC: 28.4 MMOL/L (ref 22–29)
CREAT SERPL-MCNC: 0.79 MG/DL (ref 0.76–1.27)
DEPRECATED RDW RBC AUTO: 39.1 FL (ref 37–54)
EGFRCR SERPLBLD CKD-EPI 2021: 98 ML/MIN/1.73
EOSINOPHIL # BLD AUTO: 0.06 10*3/MM3 (ref 0–0.4)
EOSINOPHIL NFR BLD AUTO: 0.7 % (ref 0.3–6.2)
ERYTHROCYTE [DISTWIDTH] IN BLOOD BY AUTOMATED COUNT: 11.8 % (ref 12.3–15.4)
GLOBULIN UR ELPH-MCNC: 2.4 GM/DL
GLUCOSE SERPL-MCNC: 93 MG/DL (ref 65–99)
HCT VFR BLD AUTO: 42.7 % (ref 37.5–51)
HGB BLD-MCNC: 15 G/DL (ref 13–17.7)
IMM GRANULOCYTES # BLD AUTO: 0.01 10*3/MM3 (ref 0–0.05)
IMM GRANULOCYTES NFR BLD AUTO: 0.1 % (ref 0–0.5)
LIPASE SERPL-CCNC: 43 U/L (ref 13–60)
LYMPHOCYTES # BLD AUTO: 1.08 10*3/MM3 (ref 0.7–3.1)
LYMPHOCYTES NFR BLD AUTO: 12.8 % (ref 19.6–45.3)
MCH RBC QN AUTO: 31.4 PG (ref 26.6–33)
MCHC RBC AUTO-ENTMCNC: 35.1 G/DL (ref 31.5–35.7)
MCV RBC AUTO: 89.5 FL (ref 79–97)
MONOCYTES # BLD AUTO: 0.51 10*3/MM3 (ref 0.1–0.9)
MONOCYTES NFR BLD AUTO: 6 % (ref 5–12)
NEUTROPHILS NFR BLD AUTO: 6.74 10*3/MM3 (ref 1.7–7)
NEUTROPHILS NFR BLD AUTO: 80 % (ref 42.7–76)
NRBC BLD AUTO-RTO: 0 /100 WBC (ref 0–0.2)
PLATELET # BLD AUTO: 248 10*3/MM3 (ref 140–450)
PMV BLD AUTO: 10.2 FL (ref 6–12)
POTASSIUM SERPL-SCNC: 4 MMOL/L (ref 3.5–5.2)
PROT SERPL-MCNC: 7.1 G/DL (ref 6–8.5)
RBC # BLD AUTO: 4.77 10*6/MM3 (ref 4.14–5.8)
SODIUM SERPL-SCNC: 141 MMOL/L (ref 136–145)
WBC NRBC COR # BLD: 8.43 10*3/MM3 (ref 3.4–10.8)

## 2023-04-16 PROCEDURE — 85025 COMPLETE CBC W/AUTO DIFF WBC: CPT | Performed by: EMERGENCY MEDICINE

## 2023-04-16 PROCEDURE — 74177 CT ABD & PELVIS W/CONTRAST: CPT

## 2023-04-16 PROCEDURE — 99284 EMERGENCY DEPT VISIT MOD MDM: CPT

## 2023-04-16 PROCEDURE — 83690 ASSAY OF LIPASE: CPT | Performed by: EMERGENCY MEDICINE

## 2023-04-16 PROCEDURE — 80053 COMPREHEN METABOLIC PANEL: CPT | Performed by: EMERGENCY MEDICINE

## 2023-04-16 PROCEDURE — 25510000001 IOPAMIDOL 61 % SOLUTION: Performed by: EMERGENCY MEDICINE

## 2023-04-16 RX ORDER — SODIUM CHLORIDE 0.9 % (FLUSH) 0.9 %
10 SYRINGE (ML) INJECTION AS NEEDED
Status: DISCONTINUED | OUTPATIENT
Start: 2023-04-16 | End: 2023-04-17 | Stop reason: HOSPADM

## 2023-04-16 RX ORDER — DOCUSATE SODIUM 100 MG/1
100 CAPSULE, LIQUID FILLED ORAL 2 TIMES DAILY PRN
Qty: 30 CAPSULE | Refills: 0 | Status: SHIPPED | OUTPATIENT
Start: 2023-04-16

## 2023-04-16 RX ORDER — LACTULOSE 10 G/15ML
20 SOLUTION ORAL 2 TIMES DAILY PRN
Qty: 473 ML | Refills: 0 | Status: SHIPPED | OUTPATIENT
Start: 2023-04-16

## 2023-04-16 RX ORDER — LACTULOSE 10 G/15ML
30 SOLUTION ORAL DAILY
Status: DISCONTINUED | OUTPATIENT
Start: 2023-04-16 | End: 2023-04-17 | Stop reason: HOSPADM

## 2023-04-16 RX ADMIN — LACTULOSE 30 G: 10 SOLUTION ORAL at 22:08

## 2023-04-16 RX ADMIN — IOPAMIDOL 85 ML: 612 INJECTION, SOLUTION INTRAVENOUS at 19:47

## 2023-04-16 NOTE — ED PROVIDER NOTES
EMERGENCY DEPARTMENT ENCOUNTER    Room Number:  26/26  Date of encounter:  4/16/2023  PCP: Nikhil Salvador MD  Historian: Patient and spouse    Patient was placed in face mask during triage process. Patient was wearing facemask when I entered the room and throughout our encounter. I wore full protective equipment throughout this patient encounter including a face mask, eye protection, and gloves. Hand hygiene was performed before donning protective equipment and again following doffing of PPE after leaving the room.    HPI:  Chief Complaint: Abdominal fullness, fecal urgency and rectal pain  A complete HPI/ROS/PMH/PSH/SH/FH are unobtainable due to: N/A   Context: Colby Santillan is a 67 y.o. male with a history of colon cancer status post resection 18 years ago who presents to the ED c/o new onset constipation over the last several weeks with abdominal fullness and some rectal discomfort after straining.  No black or bloody stools.  No dysuria hematuria.  No nausea vomiting or diarrhea.  Patient is on a strict vegetarian diet for the last 18 years since colon cancer and has not had these issues until just recently.  The patient used an enema once several weeks ago which seemed to clear amount but after 2 days he was again in this situation.  He used some Dulcolax 1 day about a week ago but had a few days of relief of symptoms but symptoms have recurred.  Today while he was on the toilet and was straining he felt like he may have suffered from a transient rectal prolapse.    MEDICAL HISTORY REVIEW      EMR reviewed:     Patient seen at PCP office by JAMES Recinos 3/31/2023 with complaint of tremor and change in bowel habits    PAST MEDICAL HISTORY  Active Ambulatory Problems     Diagnosis Date Noted   • Conductive hearing loss of both ears 04/13/2021   • Folliculitis 04/13/2021   • History of colon cancer, stage III 04/13/2021   • Urinary frequency 03/28/2022   • Benign essential tremor 03/28/2022   • History  of skin cancer 01/09/2023   • Tremor 03/31/2023   • Change in bowel habit 03/31/2023     Resolved Ambulatory Problems     Diagnosis Date Noted   • No Resolved Ambulatory Problems     Past Medical History:   Diagnosis Date   • Benign prostatic hyperplasia 01/01/2019   • Cancer 12/01/2004   • Colon polyp 12/01/2004   • HL (hearing loss) 7/1/2000         PAST SURGICAL HISTORY  Past Surgical History:   Procedure Laterality Date   • APPENDECTOMY  12/21/2004    Doctor decided to remove during cancer surgery (colon resection)   • COLON SURGERY  12/21/2004    Colon resection for cancer   • COLONOSCOPY  Latest: 2016   • FRACTURE SURGERY  1/5/1985    To repair broken arm   • VASECTOMY  10/1/1994         FAMILY HISTORY  Family History   Problem Relation Age of Onset   • Ovarian cancer Mother    • Cancer Mother         Ovarian   • Miscarriages / Stillbirths Mother    • Hypertension Father    • Prostate cancer Father    • Tremor Father    • Alcohol abuse Father    • Anxiety disorder Father    • Cancer Father         Prostate   • Hearing loss Father    • Tremor Brother    • Diabetes Brother    • Alcohol abuse Brother    • Cancer Brother         Skin   • Hearing loss Maternal Grandfather    • Mental illness Maternal Grandmother    • Diabetes Brother    • Diabetes Brother          SOCIAL HISTORY  Social History     Socioeconomic History   • Marital status:    Tobacco Use   • Smoking status: Never   • Smokeless tobacco: Never   Substance and Sexual Activity   • Alcohol use: Yes     Alcohol/week: 1.0 standard drink     Types: 1 Cans of beer per week   • Drug use: Never   • Sexual activity: Yes     Partners: Female     Birth control/protection: Surgical, None     Comment: Vasectomy         ALLERGIES  Patient has no known allergies.        REVIEW OF SYSTEMS  Review of Systems     All systems reviewed and negative except for those discussed in HPI.       PHYSICAL EXAM    I have reviewed the triage vital signs and nursing  notes.    ED Triage Vitals [04/16/23 1731]   Temp Heart Rate Resp BP SpO2   96.2 °F (35.7 °C) 71 16 -- 98 %      Temp src Heart Rate Source Patient Position BP Location FiO2 (%)   Tympanic Monitor -- -- --       Physical Exam    Physical Exam   Constitutional: No distress.   HENT:  Head: Normocephalic and atraumatic.   Oropharynx: Mucous membranes are moist.   Eyes: No scleral icterus. No conjunctival pallor.  Neck: Painless range of motion noted. Neck supple.   Cardiovascular: Normal rate, regular rhythm and intact distal pulses.  Pulmonary/Chest: No respiratory distress. There are no wheezes, no rhonchi, and no rales.   Abdominal: Soft. There is mild diffuse discomfort palpation without focal tenderness. There is no rebound and no guarding.   MIRELA: Chaperoned by nurse.  Normal external examination.  Moderate amount of firm stool high up in the rectum.  No palpable mass.  No gross blood.  Musculoskeletal: Moves all extremities equally. There is no pedal edema or calf tenderness.   Neurological: Alert.  Baseline strength and sensation noted.   Skin: Skin is pink, warm, and dry. No pallor.   Psychiatric: Mood and affect normal.   Nursing note and vitals reviewed.    LAB RESULTS  Recent Results (from the past 24 hour(s))   Comprehensive Metabolic Panel    Collection Time: 04/16/23  6:56 PM    Specimen: Blood   Result Value Ref Range    Glucose 93 65 - 99 mg/dL    BUN 10 8 - 23 mg/dL    Creatinine 0.79 0.76 - 1.27 mg/dL    Sodium 141 136 - 145 mmol/L    Potassium 4.0 3.5 - 5.2 mmol/L    Chloride 105 98 - 107 mmol/L    CO2 28.4 22.0 - 29.0 mmol/L    Calcium 10.1 8.6 - 10.5 mg/dL    Total Protein 7.1 6.0 - 8.5 g/dL    Albumin 4.7 3.5 - 5.2 g/dL    ALT (SGPT) 17 1 - 41 U/L    AST (SGOT) 19 1 - 40 U/L    Alkaline Phosphatase 106 39 - 117 U/L    Total Bilirubin 0.4 0.0 - 1.2 mg/dL    Globulin 2.4 gm/dL    A/G Ratio 2.0 g/dL    BUN/Creatinine Ratio 12.7 7.0 - 25.0    Anion Gap 7.6 5.0 - 15.0 mmol/L    eGFR 98.0 >60.0  mL/min/1.73   Lipase    Collection Time: 04/16/23  6:56 PM    Specimen: Blood   Result Value Ref Range    Lipase 43 13 - 60 U/L   CBC Auto Differential    Collection Time: 04/16/23  6:56 PM    Specimen: Blood   Result Value Ref Range    WBC 8.43 3.40 - 10.80 10*3/mm3    RBC 4.77 4.14 - 5.80 10*6/mm3    Hemoglobin 15.0 13.0 - 17.7 g/dL    Hematocrit 42.7 37.5 - 51.0 %    MCV 89.5 79.0 - 97.0 fL    MCH 31.4 26.6 - 33.0 pg    MCHC 35.1 31.5 - 35.7 g/dL    RDW 11.8 (L) 12.3 - 15.4 %    RDW-SD 39.1 37.0 - 54.0 fl    MPV 10.2 6.0 - 12.0 fL    Platelets 248 140 - 450 10*3/mm3    Neutrophil % 80.0 (H) 42.7 - 76.0 %    Lymphocyte % 12.8 (L) 19.6 - 45.3 %    Monocyte % 6.0 5.0 - 12.0 %    Eosinophil % 0.7 0.3 - 6.2 %    Basophil % 0.4 0.0 - 1.5 %    Immature Grans % 0.1 0.0 - 0.5 %    Neutrophils, Absolute 6.74 1.70 - 7.00 10*3/mm3    Lymphocytes, Absolute 1.08 0.70 - 3.10 10*3/mm3    Monocytes, Absolute 0.51 0.10 - 0.90 10*3/mm3    Eosinophils, Absolute 0.06 0.00 - 0.40 10*3/mm3    Basophils, Absolute 0.03 0.00 - 0.20 10*3/mm3    Immature Grans, Absolute 0.01 0.00 - 0.05 10*3/mm3    nRBC 0.0 0.0 - 0.2 /100 WBC       Ordered the above labs and independently reviewed the results.        RADIOLOGY  CT Abdomen Pelvis With Contrast    Result Date: 4/16/2023  CT OF THE ABDOMEN AND PELVIS WITH CONTRAST  HISTORY: Constipation  COMPARISON: 10/02/2013  TECHNIQUE: Axial CT imaging was obtained through the abdomen and pelvis. IV contrast was administered.  FINDINGS: Images through the lung bases do not demonstrate any acute abnormalities. The stomach, duodenum, adrenal glands, and pancreas are all normal. The patient does have some cysts within the liver, many of which were apparent on the prior study. The spleen is normal. Gallbladder also appears unremarkable. Kidneys enhance symmetrically. There is no hydronephrosis. There are simple appearing bilateral renal cysts. No additional follow-up is necessary. Prostate gland is enlarged and  protrudes into the base of the bladder. A section of the right side of the bladder does extend into a right inguinal hernia. The patient has a very large volume of stool noted throughout the colon. There changes of rectosigmoid resection. There is large volume of stool seen in the rectal vault distal to the anastomosis, so there is no evidence of stricture. The appendix is absent. I don't see any evidence of small bowel obstruction. No acute osseous abnormalities are seen.       1. The patient has very large volume of stool noted throughout the colon, in keeping with history of constipation. The patient does have a rectosigmoid anastomosis, but there is a large volume of stool seen distal to the anastomosis, suggesting this is not the point of obstruction. No obvious obstructing lesion is seen. 2. A portion of the right side of the patient's urinary bladder extends into a right inguinal hernia.  Radiation dose reduction techniques were utilized, including automated exposure control and exposure modulation based on body size.  This report was finalized on 4/16/2023 8:05 PM by Dr. Angelica Vilchis M.D.        I ordered the above noted radiological studies. Reviewed by me and discussed with radiologist.  See dictation for official radiology interpretation.      PROCEDURES    Procedures        MEDICATIONS GIVEN IN ER    Medications   sodium chloride 0.9 % flush 10 mL (has no administration in time range)   lactulose (CHRONULAC) 10 GM/15ML solution 30 g (has no administration in time range)   iopamidol (ISOVUE-300) 61 % injection 100 mL (85 mL Intravenous Given by Other 4/16/23 1947)         PROGRESS, DATA ANALYSIS, CONSULTS, AND MEDICAL DECISION MAKING    My differential diagnosis for abdominal pain includes but is not limited to:  Gastritis, gastroenteritis, peptic ulcer disease, GERD, esophageal perforation, acute appendicitis, mesenteric adenitis, Meckel’s diverticulum, epiploic appendagitis, diverticulitis, colon  cancer, ulcerative colitis, Crohn’s disease, intussusception, small bowel obstruction, adhesions, ischemic bowel, perforated viscus, ileus, obstipation, biliary colic, cholecystitis, cholelithiasis, Shaq-Raheel Javier, hepatitis, pancreatitis, common bile duct obstruction, cholangitis, bile leak, splenic trauma, splenic rupture, splenic infarction, splenic abscess, abdominal abscess, ascites, spontaneous bacterial peritonitis, hernia, UTI, cystitis, prostatitis, ureterolithiasis, urinary obstruction, AAA, myocardial infarction, pneumonia, cancer, porphyria, DKA, medications, sickle cell, viral syndrome, zoster      All labs have been independently reviewed by me.  All radiology studies have been reviewed by me and discussed with radiologist dictating the report.   EKG's independently viewed and interpreted by me.  Discussion below represents my analysis of pertinent findings related to patient's condition, differential diagnosis, treatment plan and final disposition.      ED Course as of 04/16/23 2039   Sun Apr 16, 2023 2021 Glucose: 93 [RS]   2021 BUN: 10 [RS]   2021 Creatinine: 0.79 [RS]   2021 Sodium: 141 [RS]   2021 Potassium: 4.0 [RS]   2021 ALT (SGPT): 17 [RS]   2021 AST (SGOT): 19 [RS]   2021 Total Bilirubin: 0.4 [RS]   2021 WBC: 8.43 [RS]   2022 Hemoglobin: 15.0 [RS]   2022 Platelets: 248 [RS]   2022 Lipase: 43 [RS]   2022 RADIOLOGY      Study: IV contrast CT of the abdomen pelvis  Findings: Large volume of colonic stool  I independently viewed and interpreted these images contemporaneously with treatment.    [RS]   2037 I reviewed all findings with the patient and his spouse.  I have offered a soapsuds enema and lactulose both of which he is agreeable with at this time.  He has an appointment with general surgery seeking to establish colonoscopy but is quite a few weeks away.  We will place an urgent outpatient follow-up request for him in the computer.  We will plan to initiate twice daily docusate for the  next several weeks and also prescribed him some Chronulac for as needed use of severe constipation. [RS]      ED Course User Index  [RS] Aaron Mcgregor MD       AS OF 20:39 EDT VITALS:    BP - 124/78  HR - 71  TEMP - 96.2 °F (35.7 °C) (Tympanic)  O2 SATS - 98%        DIAGNOSIS  Final diagnoses:   Acute abdominal pain   Constipation, unspecified constipation type         DISPOSITION  DISCHARGE    Patient discharged in stable condition.    Reviewed implications of results, diagnosis, meds, responsibility to follow up, warning signs and symptoms of possible worsening, potential complications and reasons to return to ER.    Patient/Family voiced understanding of above instructions.    Discussed plan for discharge, as there is no emergent indication for admission. Patient referred to primary care provider for regular health maintenance. Pt/family is agreeable and understands need for follow up and possible repeat testing.  Pt is aware that discharge does not mean that nothing is wrong but it indicates no emergency is present that requires admission and they must continue care with follow-up as given below or physician of their choice.     FOLLOW-UP  Jack Padilla MD  [address]  [place] [address]  [phone]          Nikhil Salvador MD  [address]  [place] [address]  [phone]    Schedule an appointment as soon as possible for a visit   As needed         Medication List      New Prescriptions    docusate sodium 100 MG capsule  Commonly known as: Colace  Take 1 capsule by mouth 2 (Two) Times a Day As Needed for Constipation. Take 1 tablet by mouth 2 times daily as needed for constipation     lactulose 10 GM/15ML solution  Commonly known as: CHRONULAC  Take 30 mL by mouth 2 (Two) Times a Day As Needed (constipation).           Where to Get Your Medications      These medications were sent to [id number] - [place], [place] - [address] [phone] [facility] [phone]  [place]  [address] [place] [address]    Phone: [phone]   · docusate sodium 100 MG capsule  · lactulose 10 GM/15ML solution            Aaron Mcgregor MD  04/16/23 2039

## 2023-04-16 NOTE — ED TRIAGE NOTES
Pt has had issues c constipation.  Today he was having a hard time stooling and felt like he was having a rectal prolapse.  He has rectal pain but no rectal bleed

## 2023-04-19 ENCOUNTER — OFFICE VISIT (OUTPATIENT)
Dept: SURGERY | Facility: CLINIC | Age: 68
End: 2023-04-19
Payer: MEDICARE

## 2023-04-19 VITALS — HEIGHT: 75 IN | WEIGHT: 174 LBS | BODY MASS INDEX: 21.64 KG/M2

## 2023-04-19 DIAGNOSIS — Z85.038 HISTORY OF COLON CANCER: Primary | ICD-10-CM

## 2023-04-19 DIAGNOSIS — K59.00 CONSTIPATION, UNSPECIFIED CONSTIPATION TYPE: ICD-10-CM

## 2023-04-19 DIAGNOSIS — R19.4 CHANGE IN BOWEL HABIT: ICD-10-CM

## 2023-04-19 RX ORDER — LANOLIN ALCOHOL/MO/W.PET/CERES
1000 CREAM (GRAM) TOPICAL DAILY
COMMUNITY

## 2023-04-19 NOTE — PROGRESS NOTES
"ASSESSMENT/PLAN:    This is a 67-year-old gentleman with a recent change in his bowel movements over the last 4 months with an additional history of colon cancer from 2004.  His last colonoscopy was normal in 2016.  With this change to constipation as well as his personal history of colon cancer, I am recommending that we proceed with a colonoscopy.  Risks and rationale were discussed with him with risk including but not limited to bleeding, infection, bowel perforation and the need for additional procedures.  Any additional recommendations will be discussed once the results of been reviewed.  I did discuss with him starting MiraLAX to hopefully prevent future episodes of constipation as prolonged use of both lactulose and Colace are not ideal.  I did talk with him about vitamin B12 and encouraged him to continue supplementing.  He did prefer the lowest amount of sedation possible during his colonoscopy and I advised him to discuss with his anesthesiologist prior to the procedure.  All questions were answered and he was willing to proceed with all recommendations.    CC:     Change in bowel habits, history of colon cancer    HPI:    This is a 67-year-old gentleman presenting to the office today at the request of JAMES Romero for consultation.  He last saw Dr. Padilla in 2016 at the time of his last colonoscopy.  He has a history significant for having had a sigmoid colon resection in 2004 for colon cancer.  His last colonoscopy was in 2016 which was essentially normal.  He has been doing very well, having converted to a plant-based diet and being very healthy over the last several years.  He was in his normal state of health until approximately 4 months ago when he began to develop constipation, with his bowel movements \"coming out like rocks.\"  This has come and gone over the course of this 4-month period, most recently causing him to go to the emergency room on 4/16/2023.  A CT scan was performed at that " point which demonstrated moderate stool burden throughout his colon including distal to his rectosigmoid anastomosis.  He was placed on lactulose and Colace in approximately 48 hours later had a normal bowel movement.  Currently he is not experiencing abdominal pain, abdominal distention, nausea, vomiting, dark tarry stools or bright red blood with his bowel movements.  He did discuss that he is taking supplemental vitamin B12 and was concerned about the possibility of this leading to constipation.    ENDOSCOPY:   • Colonoscopy 2016 normal    SOCIAL HISTORY:   • Denies tobacco use  • Occasional alcohol use    FAMILY HISTORY:    • Colorectal cancer: None    PREVIOUS ABDOMINAL SURGERY    • Colon resection for colon cancer 2004  • Appendectomy    OTHER SURGERY  Past Surgical History:   Procedure Laterality Date   • APPENDECTOMY  12/21/2004    Doctor decided to remove during cancer surgery (colon resection)   • COLON SURGERY  12/21/2004    Colon resection for cancer   • COLONOSCOPY  Latest: 2016   • FRACTURE SURGERY  1/5/1985    To repair broken arm   • VASECTOMY  10/1/1994       PAST MEDICAL HISTORY:    Past Medical History:   Diagnosis Date   • Benign prostatic hyperplasia 01/01/2019   • Cancer 12/01/2004    colon cancer   • Colon cancer Diagnosis in December, 2004    See above   • Colon polyp 12/01/2004   • HL (hearing loss) 7/1/2000   • Rectal bleeding June, 2003    Immediately had a colonoscopy, which came back negative (erroneously).  Had second colonoscopy in December, 2004 which confirmed colon cancer.   • Tremor 7/1/2010       MEDICATIONS:     Current Outpatient Medications:   •  docusate sodium (Colace) 100 MG capsule, Take 1 capsule by mouth 2 (Two) Times a Day As Needed for Constipation. Take 1 tablet by mouth 2 times daily as needed for constipation, Disp: 30 capsule, Rfl: 0  •  lactulose (CHRONULAC) 10 GM/15ML solution, Take 30 mL by mouth 2 (Two) Times a Day As Needed (constipation)., Disp: 473 mL, Rfl:  "0  •  vitamin B-12 (CYANOCOBALAMIN) 1000 MCG tablet, Take 1 tablet by mouth Daily., Disp: , Rfl:     ALLERGIES:   No Known Allergies    PHYSICAL EXAM:   • Constitutional: Well-developed well-nourished, no acute distress  • Vital signs:   o Height 75\"  o Weight 174  o BMI 21.75  • Neck: Supple, no palpable mass, trachea midline  • Respiratory: Clear to auscultation, normal inspiratory effort  • Cardiovascular: Regular rate, no murmur, no carotid bruit  • Gastrointestinal: Soft, nontender  • Psychiatric: Alert and oriented ×3, normal affect       Aleks Bonilla PA-C    "

## 2023-04-24 RX ORDER — ZINC OXIDE 13 %
1 CREAM (GRAM) TOPICAL DAILY
COMMUNITY

## 2023-04-25 ENCOUNTER — HOSPITAL ENCOUNTER (OUTPATIENT)
Facility: HOSPITAL | Age: 68
Setting detail: HOSPITAL OUTPATIENT SURGERY
Discharge: HOME | End: 2023-04-25
Attending: SURGERY | Admitting: SURGERY
Payer: MEDICARE

## 2023-04-25 VITALS
WEIGHT: 168 LBS | BODY MASS INDEX: 20.89 KG/M2 | DIASTOLIC BLOOD PRESSURE: 79 MMHG | HEIGHT: 75 IN | OXYGEN SATURATION: 100 % | RESPIRATION RATE: 16 BRPM | SYSTOLIC BLOOD PRESSURE: 132 MMHG | HEART RATE: 58 BPM

## 2023-04-25 RX ORDER — SODIUM CHLORIDE, SODIUM LACTATE, POTASSIUM CHLORIDE, CALCIUM CHLORIDE 600; 310; 30; 20 MG/100ML; MG/100ML; MG/100ML; MG/100ML
1000 INJECTION, SOLUTION INTRAVENOUS CONTINUOUS
Status: DISCONTINUED | OUTPATIENT
Start: 2023-04-25 | End: 2023-04-25 | Stop reason: HOSPADM

## 2023-04-25 RX ADMIN — SODIUM CHLORIDE, POTASSIUM CHLORIDE, SODIUM LACTATE AND CALCIUM CHLORIDE 1000 ML: 600; 310; 30; 20 INJECTION, SOLUTION INTRAVENOUS at 06:25

## 2023-04-25 NOTE — DISCHARGE INSTRUCTIONS
For the next 24 hours patient needs to be with a responsible adult.    For 24 hours DO NOT drive, operate machinery, appliances, drink alcohol, make important decisions or sign legal documents.    Start with a light or bland diet if you are feeling sick to your stomach otherwise advance to regular diet as tolerated.    Follow recommendations on procedure report if provided by your doctor.    Call Dr Padilla for problems [phone]    Problems may include but not limited to: large amounts of bleeding, trouble breathing, repeated vomiting, severe unrelieved pain, fever or chills.

## 2023-04-25 NOTE — OP NOTE
PREOPERATIVE DIAGNOSIS:  • Personal history of colon cancer status post sigmoid colectomy many years ago  • Constipation    POSTOPERATIVE DIAGNOSIS AND FINDINGS:  • Normal    PROCEDURE:  Colonoscopy to cecum     SURGEON:  Jack Padilla MD    ANESTHESIA:  None    SPECIMEN(S):  • none    DESCRIPTION:  In decubitus position digital rectal exam was normal. Colonoscope inserted under direct visualization of lumen to cecum confirmed by visualization of ileocecal valve and base of cecum (status post appendectomy).  Scope was slowly withdrawn circumferentially examining all mucosal surfaces.  Bowel preparation was excellent and there were no mucosal abnormalities.  Tolerated well.    RECOMMENDATION FOR FUTURE SURVEILLANCE:  5 years    Jack Padilla M.D.

## 2023-05-08 ENCOUNTER — CLINICAL SUPPORT (OUTPATIENT)
Dept: INTERNAL MEDICINE | Facility: CLINIC | Age: 68
End: 2023-05-08
Payer: MEDICARE

## 2023-05-08 DIAGNOSIS — E53.8 B12 DEFICIENCY: Primary | ICD-10-CM

## 2023-05-08 RX ORDER — CYANOCOBALAMIN 1000 UG/ML
1000 INJECTION, SOLUTION INTRAMUSCULAR; SUBCUTANEOUS ONCE
Status: COMPLETED | OUTPATIENT
Start: 2023-05-08 | End: 2023-05-08

## 2023-05-08 RX ADMIN — CYANOCOBALAMIN 1000 MCG: 1000 INJECTION, SOLUTION INTRAMUSCULAR; SUBCUTANEOUS at 13:25

## 2023-05-16 ENCOUNTER — OFFICE VISIT (OUTPATIENT)
Dept: NEUROLOGY | Facility: CLINIC | Age: 68
End: 2023-05-16
Payer: MEDICARE

## 2023-05-16 VITALS
HEART RATE: 72 BPM | HEIGHT: 75 IN | BODY MASS INDEX: 21.51 KG/M2 | SYSTOLIC BLOOD PRESSURE: 122 MMHG | OXYGEN SATURATION: 98 % | WEIGHT: 173 LBS | DIASTOLIC BLOOD PRESSURE: 74 MMHG

## 2023-05-16 DIAGNOSIS — R25.1 TREMOR: Primary | ICD-10-CM

## 2023-05-16 RX ORDER — MULTIPLE VITAMINS W/ MINERALS TAB 9MG-400MCG
TAB ORAL
COMMUNITY
Start: 2023-05-08

## 2023-05-16 NOTE — PROGRESS NOTES
Subjective:     Patient ID: Colby Santillan is a 67 y.o. male.    History of Present Illness  Mr. Santillan is a 67-year-old right-handed male with history of allergies, arthritis, colon cancer diagnosed in 2004 status post surgery, hearing loss, BPH, and bruxism who presents to the neurology clinic today as a new patient for the evaluation of a tremor.  On March 31, 2023 his TSH was normal.  The patient has not had any head imaging.  Both brothers have tremor.  Has left hand tremor.  This started years ago.  This has progressively gotten worse.  6-8 mos ago, noticed tremor in jaw, mainly at night.  Around the same time, started having constipation, but eats a lot of fiber.  MGF may have had PD.  Tremor is mainly with posture.  Coffee makes it worse.  Relaxing his mouth at night will help with the jaw so that it doesn't keep him awake.  No current or past medications for tremor.  EtOH does not make the tremor better.  Used a lot of antinausea medication (not sure which) on a daily basis for 2 weeks when they tried chemo for his colon cancer.      Any accompanying pulling sensations or pain? no  Walking or balance difficulties, falls: no  Stooped posture: no  Slowness of movements, decreased facial expression: no  Stiffness, decreased ROM at shoulder, difficulty turning in bed at night: no  Loss of smell: compromised by COVID (Jan 2021), got 80% back  Small handwriting: no  Acting out dreams: no  Constipation: yes  Voice volume: no  Memory loss: Not as good as it used to be.  Probably similar to peers.  Increased drooling or saliva production: no  Hallucinations: no  Day to day impairment: no  Mood: pretty even, generally happy  Orthostasis: generally not      The following portions of the patient's history were reviewed and updated as appropriate: allergies, current medications, past family history, past medical history, past social history, past surgical history and problem list.    Review of Systems      Objective:    Neurological Exam    Physical Exam     Constitutional:  Vital signs reviewed.  No apparent distress.  Well groomed.  Eyes:  No injection, no icterus.    Respiratory:  Normal effort.  Clear to auscultation bilaterally.  Cardiovascular:  Regular rate and rhythm.  No murmurs.  No carotid bruits. Symmetric radial pulses.  Musculoskeletal: Normal station.  Gait steady.  Normal arm swing.  Patient able to walk on heels and toes.  Tandem gait intact.  Romberg negative.  Muscle tone and bulk normal in the bilateral upper and lower extremities.  Strength is 5/5 in the bilateral upper and lower extremities proximally and distally unless otherwise specified in the neurological exam.  Skin:  No rashes.  Warm, dry, and intact.  Psychiatric:  Good mood.  Normal affect.    Neurologic:  Mental status-  The patient is alert and oriented to person, place and time. Attention/concentration is within normal limits.  Speech is fluent without dysarthria.  The patient is able to name, repeat and follow complex commands without difficulty.  Immediate memory and delayed recall intact (3/3 words immediate and after 4 minutes).  Fund of knowledge normal.  Cranial nerves- Pupils equally round and reactive to light with intact accomodation.  Visual fields intact.  Extraocular movements intact.  Facial sensation intact.  Smile symmetric.  Hearing intact to finger-rub bilaterally.  Palate elevates symmetrically.  SCM and trapezius are 5/5 bilaterally.  Tongue is midline.  Motor-  See musculoskeletal above.  Minor postural tremor in the left upper extremity.  His spirals are scanned in.  Reflexes- 2+ in the bilateral biceps, brachioradialis, patellar and trace achilles.  Toes down-going bilaterally.  Sensation- Intact to pinprick and vibration in bilateral upper and lower extremities symmetrically.  Coordination- Intact to finger to nose and heel knee shin bilaterally.   Gait- See musculoskeletal exam above.            Assessment/Plan:    The patient is a 67-year-old right-handed male with history of allergies, arthritis, colon cancer, hearing loss, BPH, and bruxism who presents today for evaluation.    1.  Tremor-the patient has a minimal tremor in the left upper extremity with posture and action.  He does not display any resting tremor, bradykinesia or rigidity.  I do not suspect any parkinsonian motor features on today's exam however it could be too early to tell.  We discussed options of monitoring his symptoms over time, getting a second opinion from a movement disorder specialist or doing a DaTscan.  The patient would like to think about his options and get back with us.  Due to his history of cancer it would be a good idea to go ahead and get an MRI of the brain to make sure that there are no underlying structural lesions causing his symptoms.    A total of 60 minutes of time was spent on this encounter today.  This includes reviewing the patient's records, face-to-face time, documentation.       Problems Addressed this Visit        Neuro    Tremor - Primary    Relevant Orders    MRI Brain With & Without Contrast   Diagnoses       Codes Comments    Tremor    -  Primary ICD-10-CM: R25.1  ICD-9-CM: 781.0

## 2023-05-16 NOTE — LETTER
May 16, 2023     JAMES Romero  [address]  [place] [id number]    Patient: Colby Santillan   YOB: 1955   Date of Visit: 5/16/2023       Dear JAMES Preston:    Thank you for referring Colby Santillan to me for evaluation. Below are the relevant portions of my assessment and plan of care.    If you have questions, please do not hesitate to call me. I look forward to following Colby along with you.         Sincerely,        Lilo Boston MD        CC: No Recipients    Lilo Boston MD  05/16/23 1416  Signed  Subjective:     Patient ID: Colby Santillan is a 67 y.o. male.    History of Present Illness  Mr. Santillan is a 67-year-old right-handed male with history of allergies, arthritis, colon cancer diagnosed in 2004 status post surgery, hearing loss, BPH, and bruxism who presents to the neurology clinic today as a new patient for the evaluation of a tremor.  On March 31, 2023 his TSH was normal.  The patient has not had any head imaging.  Both brothers have tremor.  Has left hand tremor.  This started years ago.  This has progressively gotten worse.  6-8 mos ago, noticed tremor in jaw, mainly at night.  Around the same time, started having constipation, but eats a lot of fiber.  MGF may have had PD.  Tremor is mainly with posture.  Coffee makes it worse.  Relaxing his mouth at night will help with the jaw so that it doesn't keep him awake.  No current or past medications for tremor.  EtOH does not make the tremor better.  Used a lot of antinausea medication (not sure which) on a daily basis for 2 weeks when they tried chemo for his colon cancer.      Any accompanying pulling sensations or pain? no  Walking or balance difficulties, falls: no  Stooped posture: no  Slowness of movements, decreased facial expression: no  Stiffness, decreased ROM at shoulder, difficulty turning in bed at night: no  Loss of smell: compromised by COVID (Jan 2021), got 80% back  Small  handwriting: no  Acting out dreams: no  Constipation: yes  Voice volume: no  Memory loss: Not as good as it used to be.  Probably similar to peers.  Increased drooling or saliva production: no  Hallucinations: no  Day to day impairment: no  Mood: pretty even, generally happy  Orthostasis: generally not      The following portions of the patient's history were reviewed and updated as appropriate: allergies, current medications, past family history, past medical history, past social history, past surgical history and problem list.    Review of Systems     Objective:    Neurological Exam    Physical Exam     Constitutional:  Vital signs reviewed.  No apparent distress.  Well groomed.  Eyes:  No injection, no icterus.    Respiratory:  Normal effort.  Clear to auscultation bilaterally.  Cardiovascular:  Regular rate and rhythm.  No murmurs.  No carotid bruits. Symmetric radial pulses.  Musculoskeletal: Normal station.  Gait steady.  Normal arm swing.  Patient able to walk on heels and toes.  Tandem gait intact.  Romberg negative.  Muscle tone and bulk normal in the bilateral upper and lower extremities.  Strength is 5/5 in the bilateral upper and lower extremities proximally and distally unless otherwise specified in the neurological exam.  Skin:  No rashes.  Warm, dry, and intact.  Psychiatric:  Good mood.  Normal affect.    Neurologic:  Mental status-  The patient is alert and oriented to person, place and time. Attention/concentration is within normal limits.  Speech is fluent without dysarthria.  The patient is able to name, repeat and follow complex commands without difficulty.  Immediate memory and delayed recall intact (3/3 words immediate and after 4 minutes).  Fund of knowledge normal.  Cranial nerves- Pupils equally round and reactive to light with intact accomodation.  Visual fields intact.  Extraocular movements intact.  Facial sensation intact.  Smile symmetric.  Hearing intact to finger-rub bilaterally.   Palate elevates symmetrically.  SCM and trapezius are 5/5 bilaterally.  Tongue is midline.  Motor-  See musculoskeletal above.  Minor postural tremor in the left upper extremity.  His spirals are scanned in.  Reflexes- 2+ in the bilateral biceps, brachioradialis, patellar and trace achilles.  Toes down-going bilaterally.  Sensation- Intact to pinprick and vibration in bilateral upper and lower extremities symmetrically.  Coordination- Intact to finger to nose and heel knee shin bilaterally.   Gait- See musculoskeletal exam above.           Assessment/Plan:    The patient is a 67-year-old right-handed male with history of allergies, arthritis, colon cancer, hearing loss, BPH, and bruxism who presents today for evaluation.    1.  Tremor-the patient has a minimal tremor in the left upper extremity with posture and action.  He does not display any resting tremor, bradykinesia or rigidity.  I do not suspect any parkinsonian motor features on today's exam however it could be too early to tell.  We discussed options of monitoring his symptoms over time, getting a second opinion from a movement disorder specialist or doing a DaTscan.  The patient would like to think about his options and get back with us.  Due to his history of cancer it would be a good idea to go ahead and get an MRI of the brain to make sure that there are no underlying structural lesions causing his symptoms.    A total of 60 minutes of time was spent on this encounter today.  This includes reviewing the patient's records, face-to-face time, documentation.      Problems Addressed this Visit        Neuro    Tremor - Primary    Relevant Orders    MRI Brain With & Without Contrast   Diagnoses       Codes Comments    Tremor    -  Primary ICD-10-CM: R25.1  ICD-9-CM: 781.0

## 2023-05-26 ENCOUNTER — PATIENT ROUNDING (BHMG ONLY) (OUTPATIENT)
Dept: NEUROLOGY | Facility: CLINIC | Age: 68
End: 2023-05-26
Payer: MEDICARE

## 2023-05-31 ENCOUNTER — OFFICE VISIT (OUTPATIENT)
Dept: INTERNAL MEDICINE | Facility: CLINIC | Age: 68
End: 2023-05-31

## 2023-05-31 VITALS
HEART RATE: 62 BPM | SYSTOLIC BLOOD PRESSURE: 120 MMHG | BODY MASS INDEX: 21.88 KG/M2 | DIASTOLIC BLOOD PRESSURE: 82 MMHG | HEIGHT: 75 IN | WEIGHT: 176 LBS | OXYGEN SATURATION: 97 %

## 2023-05-31 DIAGNOSIS — M25.562 CHRONIC PAIN OF LEFT KNEE: Primary | Chronic | ICD-10-CM

## 2023-05-31 DIAGNOSIS — E53.8 B12 DEFICIENCY: Chronic | ICD-10-CM

## 2023-05-31 DIAGNOSIS — L98.9 SKIN LESION OF RIGHT ARM: ICD-10-CM

## 2023-05-31 DIAGNOSIS — G89.29 CHRONIC PAIN OF LEFT KNEE: Primary | Chronic | ICD-10-CM

## 2023-05-31 RX ORDER — CYANOCOBALAMIN 1000 UG/ML
1000 INJECTION, SOLUTION INTRAMUSCULAR; SUBCUTANEOUS ONCE
Status: COMPLETED | OUTPATIENT
Start: 2023-05-31 | End: 2023-05-31

## 2023-05-31 RX ADMIN — CYANOCOBALAMIN 1000 MCG: 1000 INJECTION, SOLUTION INTRAMUSCULAR; SUBCUTANEOUS at 09:19

## 2023-05-31 NOTE — PROGRESS NOTES
"Chief Complaint  Skin Lesion (Referral to Dr. Luna Way), Knee Pain (Orthopedic referral ), and B12 Injection    Subjective        Colby Santillan presents to [facility]  History of Present Illness  This is a 68 y/o male presenting to office for f/u with b12 deficiency; Patient continues on oral b12 supplementation. Patient had last b12 injection 4 weeks ago and would like to have his second one today.     Patient reports unusual mole on right shoulder-- patient reports he needs a referral to dermatology. Patient reports this started about 6 months ago. Patient reports scaling to the top of the mole along with some redness.     Patient reports ongoing pain to left knee. Patient reports intermittent pain to the area. Patient reports worsening pain with activity. Patient reports the pain is middle and inside. Patient reports taking no medication OTC. Denies using heat or ice for this. Patient is requesting referral to orthopedics.     Objective   Vital Signs:  /82 (BP Location: Left arm, Patient Position: Sitting, Cuff Size: Adult)   Pulse 62   Ht 190.5 cm (75\")   Wt 79.8 kg (176 lb)   SpO2 97%   BMI 22.00 kg/m²   Estimated body mass index is 22 kg/m² as calculated from the following:    Height as of this encounter: 190.5 cm (75\").    Weight as of this encounter: 79.8 kg (176 lb).       BMI is within normal parameters. No other follow-up for BMI required.      Physical Exam  Constitutional:       Appearance: Normal appearance.   HENT:      Head: Normocephalic and atraumatic.      Right Ear: External ear normal.      Left Ear: External ear normal.   Eyes:      Conjunctiva/sclera: Conjunctivae normal.      Pupils: Pupils are equal, round, and reactive to light.   Cardiovascular:      Rate and Rhythm: Normal rate and regular rhythm.      Pulses: Normal pulses.      Heart sounds: Normal heart sounds. No murmur heard.    No friction rub. No gallop.   Pulmonary:      Effort: " Pulmonary effort is normal. No respiratory distress.      Breath sounds: Normal breath sounds. No stridor. No wheezing, rhonchi or rales.   Musculoskeletal:         General: Tenderness present.      Cervical back: Normal range of motion and neck supple.      Left knee: Decreased range of motion. Tenderness present.   Skin:     General: Skin is warm and dry.      Capillary Refill: Capillary refill takes less than 2 seconds.          Neurological:      General: No focal deficit present.      Mental Status: He is alert and oriented to person, place, and time. Mental status is at baseline.   Psychiatric:         Mood and Affect: Mood normal.         Thought Content: Thought content normal.         Judgment: Judgment normal.        Result Review :  The following data was reviewed by: JAMES Romero on 05/31/2023:  Common labs        3/31/2023    08:48 4/16/2023    18:56   Common Labs   Glucose 91   93     BUN 12   10     Creatinine 0.84   0.79     Sodium 143   141     Potassium 5.1   4.0     Chloride 105   105     Calcium 10.4   10.1     Total Protein 7.0      Albumin 4.5   4.7     Total Bilirubin 0.7   0.4     Alkaline Phosphatase 119   106     AST (SGOT) 16   19     ALT (SGPT) 16   17     WBC  8.43     Hemoglobin  15.0     Hematocrit  42.7     Platelets  248     Hemoglobin A1C 5.10                     Assessment and Plan   Diagnoses and all orders for this visit:    1. Chronic pain of left knee (Primary)  -     Ambulatory Referral to Orthopedic Surgery    2. Skin lesion of right arm  -     Ambulatory Referral to Dermatology    3. B12 deficiency  Assessment & Plan:  Continues on oral b12 supplementation.   B12 injection today.   Recommended recheck labs with Dr. Salvador at next appt.     Orders:  -     cyanocobalamin injection 1,000 mcg           Follow Up   Return for Next scheduled follow up 7/11/23.  Patient was given instructions and counseling regarding his condition or for health maintenance advice. Please see  specific information pulled into the AVS if appropriate.

## 2023-05-31 NOTE — ASSESSMENT & PLAN NOTE
Continues on oral b12 supplementation.   B12 injection today.   Recommended recheck labs with Dr. Salvador at next appt.

## 2023-06-06 ENCOUNTER — OFFICE VISIT (OUTPATIENT)
Dept: GASTROENTEROLOGY | Facility: CLINIC | Age: 68
End: 2023-06-06
Payer: MEDICARE

## 2023-06-06 VITALS
BODY MASS INDEX: 21.86 KG/M2 | HEIGHT: 75 IN | TEMPERATURE: 96.1 F | SYSTOLIC BLOOD PRESSURE: 119 MMHG | WEIGHT: 175.8 LBS | HEART RATE: 80 BPM | DIASTOLIC BLOOD PRESSURE: 80 MMHG

## 2023-06-06 DIAGNOSIS — R19.8 CHANGE IN BOWEL FUNCTION: Primary | ICD-10-CM

## 2023-06-06 DIAGNOSIS — Z85.038 HISTORY OF COLON CANCER: ICD-10-CM

## 2023-06-06 PROCEDURE — 99202 OFFICE O/P NEW SF 15 MIN: CPT | Performed by: INTERNAL MEDICINE

## 2023-06-06 PROCEDURE — 1159F MED LIST DOCD IN RCRD: CPT | Performed by: INTERNAL MEDICINE

## 2023-06-06 PROCEDURE — 1160F RVW MEDS BY RX/DR IN RCRD: CPT | Performed by: INTERNAL MEDICINE

## 2023-06-06 NOTE — PROGRESS NOTES
"Chief Complaint   Patient presents with    change in bowel habits    Colon Cancer        Colby Santillan is a  67 y.o. male here for an initial visit for change in bowel function, history of colon cancer    HPI this 67-year-old white male patient of Dr. Nikhil Salvador presents with a history of change in bowel pattern dating back 8 months ago.  His routine bowel pattern had been 3 formed stools per day.  He does have a history of colon cancer with left hemicolectomy 20 years ago.  A recent colonoscopic examination by Dr. Padilla was normal to the cecum.  He was offered follow-up colonoscopy in 5 years time given his history.  He has also had issues with vitamin B12 deficiency and is currently on supplement.  He did undergo thyroid function studies back in March which were normal.  He states his bowel pattern has returned to his baseline at this time.  No reported melena or bright red blood per rectum.  No upper GI complaints per his account.  Weight has been stable.  We did discuss possible use of a water soluble fiber in the future as needed but would offer follow-up only if he has further GI related complaints.  I did encourage him to have his colonoscopy performed routinely as per the surgeon's recommendations.    Past Medical History:   Diagnosis Date    Anesthesia complication     PATIENT REQUEST \"MINIMAL SEDATION\" NOT FOR ANY PROBLEMS JUST PREFERENCE    Benign prostatic hyperplasia 01/01/2019    Cancer 12/01/2004    colon cancer    Colon cancer Diagnosis in December, 2004    See above    Colon polyp 12/01/2004    Constipation     WAS IN THE ER PREVIOUSLY FOR THIS..HAD CT SCAN AT THIS TIME    Hernia 5/1/23    Noted in c-scan    History of chemotherapy     STATES ONE TREATMENT FOR COLON CANCER. UNABLE TO TOLERATE    HL (hearing loss) 07/01/2000    WITH YRIS HEARING AIDS    Lactose intolerance 2000    Eliminated dairy from my diet    Rectal bleeding June, 2003    Immediately had a colonoscopy, which came back " negative (erroneously).  Had second colonoscopy in December, 2004 which confirmed colon cancer.    Skin cancer     REMOVED FROM FACE AND CHEST    Tremor 7/1/2010       Current Outpatient Medications   Medication Sig Dispense Refill    multivitamin with minerals tablet tablet       Probiotic Product (Probiotic Daily) capsule Take 1 tablet by mouth Daily.      vitamin B-12 (CYANOCOBALAMIN) 1000 MCG tablet Take 1 tablet by mouth Daily.       No current facility-administered medications for this visit.       PRN Meds:.    No Known Allergies    Social History     Socioeconomic History    Marital status:    Tobacco Use    Smoking status: Never    Smokeless tobacco: Never   Vaping Use    Vaping Use: Never used   Substance and Sexual Activity    Alcohol use: Yes     Alcohol/week: 1.0 standard drink     Types: 1 Cans of beer per week     Comment: Infrequent    Drug use: Never    Sexual activity: Yes     Partners: Female     Birth control/protection: Vasectomy     Comment: Vasectomy       Family History   Problem Relation Age of Onset    Ovarian cancer Mother     Cancer Mother         Ovarian    Miscarriages / Stillbirths Mother     Hypertension Father     Prostate cancer Father     Tremor Father     Alcohol abuse Father     Anxiety disorder Father     Cancer Father         Prostate    Hearing loss Father     Tremor Brother     Diabetes Brother     Alcohol abuse Brother     Cancer Brother         Skin    Colon polyps Brother     Hearing loss Maternal Grandfather     Mental illness Maternal Grandmother     Diabetes Brother     Diabetes Brother     Colon polyps Brother        Review of Systems   Constitutional:  Negative for activity change, appetite change, fatigue and unexpected weight change.   HENT:  Negative for congestion, facial swelling, sore throat, trouble swallowing and voice change.    Eyes:  Negative for photophobia and visual disturbance.   Respiratory:  Negative for cough and choking.    Cardiovascular:   Negative for chest pain.   Gastrointestinal:  Positive for constipation. Negative for abdominal distention, abdominal pain, anal bleeding, blood in stool, diarrhea, nausea, rectal pain and vomiting.   Endocrine: Negative for polyphagia.   Musculoskeletal:  Negative for arthralgias, gait problem and joint swelling.   Skin:  Negative for color change, pallor and rash.   Allergic/Immunologic: Negative for food allergies.   Neurological:  Negative for speech difficulty and headaches.   Hematological:  Does not bruise/bleed easily.   Psychiatric/Behavioral:  Negative for agitation, confusion and sleep disturbance.      Vitals:    06/06/23 0907   BP: 119/80   Pulse: 80   Temp: 96.1 °F (35.6 °C)       Physical Exam  Vitals reviewed.   Constitutional:       General: He is not in acute distress.     Appearance: He is well-developed. He is not diaphoretic.   HENT:      Head: Normocephalic.      Mouth/Throat:      Pharynx: No oropharyngeal exudate.   Eyes:      General: No scleral icterus.     Conjunctiva/sclera: Conjunctivae normal.   Neck:      Thyroid: No thyromegaly.   Cardiovascular:      Rate and Rhythm: Normal rate and regular rhythm.      Heart sounds: No murmur heard.  Pulmonary:      Effort: No respiratory distress.      Breath sounds: Normal breath sounds. No wheezing or rales.   Abdominal:      General: Bowel sounds are normal. There is no distension.      Palpations: Abdomen is soft. There is no mass.      Tenderness: There is no abdominal tenderness.   Musculoskeletal:         General: No tenderness. Normal range of motion.      Cervical back: Normal range of motion.   Lymphadenopathy:      Cervical: No cervical adenopathy.   Skin:     General: Skin is warm and dry.      Findings: No erythema or rash.   Neurological:      Mental Status: He is alert and oriented to person, place, and time.   Psychiatric:         Behavior: Behavior normal.       ASSESSMENT   #1 history of colon cancer  #2 change in bowel pattern:  Resolved      PLAN  Advise routine colonoscopy every 5 years  Office follow-up as needed.      ICD-10-CM ICD-9-CM   1. Change in bowel function  R19.8 787.99   2. History of colon cancer  Z85.038 V10.05

## 2023-06-08 ENCOUNTER — OFFICE VISIT (OUTPATIENT)
Dept: ORTHOPEDIC SURGERY | Facility: CLINIC | Age: 68
End: 2023-06-08
Payer: MEDICARE

## 2023-06-08 VITALS — HEIGHT: 75 IN | WEIGHT: 175.6 LBS | TEMPERATURE: 97.3 F | RESPIRATION RATE: 16 BRPM | BODY MASS INDEX: 21.83 KG/M2

## 2023-06-08 DIAGNOSIS — M25.562 CHRONIC PAIN OF LEFT KNEE: ICD-10-CM

## 2023-06-08 DIAGNOSIS — G89.29 CHRONIC PAIN OF LEFT KNEE: ICD-10-CM

## 2023-06-08 DIAGNOSIS — R52 PAIN: Primary | ICD-10-CM

## 2023-06-08 NOTE — PROGRESS NOTES
Patient: Colby Santillan  YOB: 1955 67 y.o. male  Medical Record Number: [id number]    Chief Complaints:   Chief Complaint   Patient presents with    Left Knee - Pain, Initial Evaluation       History of Present Illness:Colby Santillan is a 67 y.o. male who presents as a new patient to our practice with complaints of having left knee pain that is chronic in nature.  Patient reports that he has been dealing with this issue off and on for the last few years with his symptoms progressively worsening.  Patient states that he was a  and played tennis and knows that he has had a previous meniscus injury.  Patient reports he did not have arthroscopic surgery however.  Patient states that his symptoms are primarily brought on when he exercises.  Patient states the pain is intermittent and describes it as an achy deep pain.  Patient states the pain is primarily located to the posterior aspect on the lateral side.  He denies any recent fall injury or trauma.  Patient states that he has no knee instability or buckling or any catching or locking sensations currently.  Reports that he only takes Tylenol as needed for pain.    Allergies: No Known Allergies    Medications:   Current Outpatient Medications   Medication Sig Dispense Refill    multivitamin with minerals tablet tablet       Probiotic Product (Probiotic Daily) capsule Take 1 tablet by mouth Daily.      vitamin B-12 (CYANOCOBALAMIN) 1000 MCG tablet Take 1 tablet by mouth Daily.       No current facility-administered medications for this visit.         The following portions of the patient's history were reviewed and updated as appropriate: allergies, current medications, past family history, past medical history, past social history, past surgical history and problem list.    Review of Systems:   Pertinent positives/negatives listed in HPI above    Physical Exam:   Vitals:    06/08/23 1358   Resp: 16   Temp: 97.3 °F (36.3 °C)   TempSrc:  "Temporal   Weight: 79.7 kg (175 lb 9.6 oz)   Height: 190.5 cm (75\")       General: A and O x 3, ASA, NAD      Knee Exam List: Knee:  left    ALIGNMENT:     Neutral  ,   Patella tracks   midline    GAIT:     Nonantalgic    SKIN:    No abnormality    RANGE OF MOTION:   0  -  135   DEG    STRENGTH:   5 / 5    LIGAMENTS:    No varus / valgus instability.   Negative  Lachman.    MENISCUS:     Negative   Adam       DISTAL PULSES:    Paplable    DISTAL SENSATION :   Intact    LYMPHATICS:     No   lymphadenopathy    OTHER:          - No  effusion      - No crepitance with ROM      - No Swelling /  tenderness to palpation pes anserine bursa        Radiology:  Xrays 3views left knee (ap,lateral, sunrise) were ordered and reviewed for evaluation of knee pain demonstrating minimal arthritic findings of slight peaking of the tibial spines.  No other x-rays available for comparison purposes.    Assessment/Plan: Left knee pain    Treatment options as well as imaging results were discussed in detail with the patient.  At this point in time we would like to proceed forward with conservative measures.  I am going to give the patient a prescription for physical therapy to work of range of motion and strengthening exercises.  I did also educate him that he can take anti-inflammatory medications to help with the pain and inflammation.  Should the patient's symptoms not be improved within the next 4 to 6 weeks from physical therapy as well as NSAID therapy, he would likely need to do an MRI of the right knee as this has been an ongoing chronic issue.     Pankaj Tolliver, APRN  6/8/2023  "

## 2023-09-08 ENCOUNTER — TELEPHONE (OUTPATIENT)
Dept: INTERNAL MEDICINE | Facility: CLINIC | Age: 68
End: 2023-09-08

## 2023-09-08 ENCOUNTER — TELEPHONE (OUTPATIENT)
Dept: INTERNAL MEDICINE | Facility: CLINIC | Age: 68
End: 2023-09-08
Payer: MEDICARE

## 2023-09-08 DIAGNOSIS — N40.1 BENIGN PROSTATIC HYPERPLASIA WITH URINARY FREQUENCY: ICD-10-CM

## 2023-09-08 DIAGNOSIS — R35.0 URINARY FREQUENCY: Primary | ICD-10-CM

## 2023-09-08 DIAGNOSIS — R35.0 BENIGN PROSTATIC HYPERPLASIA WITH URINARY FREQUENCY: ICD-10-CM

## 2023-09-08 NOTE — TELEPHONE ENCOUNTER
Caller: Colby Santillan    Relationship: Self    Best call back number: [phone]    What is the medical concern/diagnosis: POSSIBLE SKIN CANCER    What specialty or service is being requested: DERMATOLOGIST    What is the provider, practice or medical service name: DR. MISTI GARZA    What is the office location: [facility]     What is the office phone number: [phone]    Any additional details: PATIENT STATED THEY HAD PREVIOUSLY GOTTEN A REFERRAL FOR THIS PROVIDER AND WAS UNSURE IF A NEW ONE WAS NEEDED SO WOULD LIKE ONE IN CASE

## 2023-09-08 NOTE — TELEPHONE ENCOUNTER
Patients insurance has changed since this referral was placed in February. Please update referral with new insurance ([employer])

## 2023-09-08 NOTE — TELEPHONE ENCOUNTER
Can you please place another current outgoing referral to [facility] so it will show Colby Santillan new insurance

## 2023-09-08 NOTE — TELEPHONE ENCOUNTER
[facility] Hospital Medicine  Discharge Summary      Patient Name: Baltazar Mccray  MRN: [id number]  Admission Date: 8/12/2018  Hospital Length of Stay: 0 days  Discharge Date and Time: 8/14/2018 12:17 PM  Attending Physician: No att. providers found   Discharging Provider: Chevy Rodriguez PA-C  Primary Care Provider: [facility] Medicine Team: [facility] MED E Chevy Rodriguez PA-C    HPI:   Baltazar Mccray is a pleasant 57M with HTN, seizure history, COPD on 2L NC PRN at home who presents with SOB. While on his way to work he was not on oxygen and experienced SOB. He has a home oxygen concentrator and uses 2L O2 while at home/nightly, but does not use oxygen when leaving the house due to lack of equipment. He is often SOB while out of the house, but can usually control his syptoms with rescue inhalers. Today his SOB was refractory to his rescue inhaler. He continues to smoke. He denies fever, has dry cough, no CP, no NVD, no dysuria, no LE edema, endorses use of controller inhaler. He was recently seen in this ED 08/05 and 08/08 for SOB, given medrol dose pack discharged home after improvement seen in the ED.    ED work up remarkable for CXR clear, troponin mildly elevated at 0.031>0.018, EKG w/ chronic TWI V1, V2, V3 but w/o acute ischemic changes, WBC WNL, procal negative, on 2L NC with SpO2 96%, no respiratory distress.     * No surgery found *      Hospital Course:   Patient admitted to observation for COPD exacerbation. Patient started on COPD pathway, improving on 2L O2. Patient was unable to qualify for portable oxygen concentrator per 6 minute walk test. Prior to discharge patient ambulated independently, without difficulty. Will continue Azithromycin and steroids at discharge.      Consults:   Consults (From admission, onward)        Status Ordering Provider     Inpatient consult to Social Work/Case Management  Once     Provider:  (Not yet assigned)    Completed  Referral ordered   DANUTA COLE          * COPD exacerbation    Did not meet qualifications for portable concentrator, symptoms much improved following steroids and antibiotics  - continue 2L PRN at home  - started on COPD pathway, cessation counseling provided  - wants to start chanxtix to quit smoking, needs PCP for monitoring  - nebs ATC, prednisone 40 mg daily, start azithromycin x 5 days  - continue home controllers, patient has insurance and admits compliance- refills provided          Final Active Diagnoses:    Diagnosis Date Noted POA    PRINCIPAL PROBLEM:  COPD exacerbation [J44.1] 05/14/2017 Yes    Elevated troponin [R74.8] 08/12/2018 Yes    Chronic respiratory failure with hypoxia and hypercapnia [J96.11, J96.12] 08/12/2018 Yes    Tobacco abuse [Z72.0] 08/12/2018 Yes    Seizures [R56.9] 11/20/2017 Yes    Essential hypertension [I10] 05/19/2017 Yes      Problems Resolved During this Admission:       Discharged Condition: stable    Disposition: Home or Self Care    Follow Up:  Follow-up Information     Rick Hsieh MD On 8/20/2018.    Specialty:  Family Medicine  Why:  at 3:00pm; arrive at 2:30pm with ID, insurance card, & all medications  Contact information:  [address]  [facility] [address]  [phone]                 Patient Instructions:      Diet Adult Regular     Notify your health care provider if you experience any of the following:  difficulty breathing or increased cough     Activity as tolerated       Significant Diagnostic Studies: Labs: All labs within the past 24 hours have been reviewed    Pending Diagnostic Studies:     None         Medications:  Reconciled Home Medications:      Medication List      START taking these medications    * albuterol-ipratropium 2.5 mg-0.5 mg/3 mL nebulizer solution  Commonly known as:  DUO-NEB  Take 3 mLs by nebulization every 4 (four) hours. Rescue     * ipratropium-albuterol  mcg/actuation inhaler  Commonly known as:   COMBIVENT  Inhale 1 puff into the lungs 4 (four) times daily. Rescue     amLODIPine 5 MG tablet  Commonly known as:  NORVASC  Take 1 tablet (5 mg total) by mouth once daily.     azithromycin 250 MG tablet  Commonly known as:  Z-DIEGO  Take 1 tablet (250 mg total) by mouth once daily.         * This list has 2 medication(s) that are the same as other medications prescribed for you. Read the directions carefully, and ask your doctor or other care provider to review them with you.            CHANGE how you take these medications    * albuterol 90 mcg/actuation inhaler  Inhale 2 puffs into the lungs every 6 (six) hours as needed for Wheezing.  What changed:  Another medication with the same name was added. Make sure you understand how and when to take each.     * albuterol 2.5 mg /3 mL (0.083 %) nebulizer solution  Commonly known as:  PROVENTIL  Take 3 mLs (2.5 mg total) by nebulization every 4 (four) hours as needed (shortness of breath). Rescue  What changed:  You were already taking a medication with the same name, and this prescription was added. Make sure you understand how and when to take each.     * fluticasone-vilanterol 100-25 mcg/dose diskus inhaler  Commonly known as:  BREO ELLIPTA  Inhale 1 puff into the lungs once daily. Controller  What changed:  Another medication with the same name was added. Make sure you understand how and when to take each.     * fluticasone-vilanterol 100-25 mcg/dose diskus inhaler  Commonly known as:  BREO ELLIPTA  Inhale 1 puff into the lungs once daily. Controller  What changed:  You were already taking a medication with the same name, and this prescription was added. Make sure you understand how and when to take each.         * This list has 4 medication(s) that are the same as other medications prescribed for you. Read the directions carefully, and ask your doctor or other care provider to review them with you.            CONTINUE taking these medications    benzonatate 200 MG  capsule  Commonly known as:  TESSALON  Take 1 capsule (200 mg total) by mouth 3 (three) times daily as needed for Cough.     ibuprofen 800 MG tablet  Commonly known as:  ADVIL,MOTRIN  Take 1 tablet (800 mg total) by mouth every 6 (six) hours as needed for Pain.     omeprazole 20 MG capsule  Commonly known as:  PRILOSEC  Take 1 capsule (20 mg total) by mouth once daily.     predniSONE 20 MG tablet  Commonly known as:  DELTASONE  Take 2 tablets (40 mg total) by mouth once daily. for 10 days     tiotropium 18 mcg inhalation capsule  Commonly known as:  SPIRIVA  Inhale 1 capsule (18 mcg total) into the lungs once daily. Controller        STOP taking these medications    levETIRAcetam 1000 MG tablet  Commonly known as:  KEPPRA     methylPREDNISolone 4 mg tablet  Commonly known as:  MEDROL DOSEPACK     senna-docusate 8.6-50 mg 8.6-50 mg per tablet  Commonly known as:  PERICOLACE            Indwelling Lines/Drains at time of discharge:   Lines/Drains/Airways          None          Time spent on the discharge of patient: 30 minutes  Patient was seen and examined on the date of discharge and determined to be suitable for discharge.         Chevy Rodriguez PA-C  Department of Hospital Medicine  [facility]

## 2023-09-08 NOTE — TELEPHONE ENCOUNTER
Caller: Colby Santillan    Relationship: Self    Best call back number: [phone]    What is the medical concern/diagnosis: ENLARGED PROSTATE    What specialty or service is being requested: UROLOGIST    What is the provider, practice or medical service name: [facility]    What is the office location: [place]    What is the office phone number: [phone]    Any additional details: PATIENT STATED THEY WOULD LIKE TO GET A REFERRAL SINCE A CHANGE IN INSURANCE AND PROVIDER TO MAKE SURE IT WILL BE COVERED. PLEASE CALL IF ADDITIONAL INFORMATION

## 2023-10-08 ENCOUNTER — PATIENT MESSAGE (OUTPATIENT)
Dept: NEUROLOGY | Facility: CLINIC | Age: 68
End: 2023-10-08
Payer: MEDICARE

## 2023-10-08 DIAGNOSIS — R25.1 TREMOR: Primary | ICD-10-CM

## 2023-10-09 NOTE — TELEPHONE ENCOUNTER
From: Colby Santillan  To: Lilo Boston  Sent: 10/8/2023 12:52 PM EDT  Subject: Referral request    Dr. Boston,   I had an appointment with you earlier this year related to a tremor in my left hand and jaw. In the course of our appointment, you mentioned the names of two doctors in [place] who specialize in tremors and/or Parkinson's Disease. Could you make a referral for me to see one of the as soon as possible? Or, alternatively, could you give me their names and I will make the appointment.    Thank you.

## 2023-11-06 ENCOUNTER — TELEPHONE (OUTPATIENT)
Dept: INTERNAL MEDICINE | Facility: CLINIC | Age: 68
End: 2023-11-06

## 2023-11-06 DIAGNOSIS — R35.0 BENIGN PROSTATIC HYPERPLASIA WITH URINARY FREQUENCY: ICD-10-CM

## 2023-11-06 DIAGNOSIS — N40.1 BENIGN PROSTATIC HYPERPLASIA WITH URINARY FREQUENCY: ICD-10-CM

## 2023-11-06 DIAGNOSIS — R35.0 URINARY FREQUENCY: Primary | ICD-10-CM

## 2023-11-06 NOTE — TELEPHONE ENCOUNTER
Caller: Colby Santillan    Relationship: Self    Best call back number: [phone]     What is the medical concern/diagnosis:      What specialty or service is being requested: UROLOGY    What is the provider, practice or medical service name:      What is the office location:      What is the office phone number:      Any additional details:  PATIENT CALLED STATED HE WAS NOT COMFORTABLE WITH CARE FROM [facility] AND WANTS TO KNOW IF DR RODRIGUEZ CAN REFER HIM TO ANOTHER UROLOGIST.

## 2023-11-06 NOTE — TELEPHONE ENCOUNTER
I can but he would be limited to [facility].  Otherwise he would have to go outside the area.  Is he okay with [facility]?

## 2024-05-13 ENCOUNTER — TELEPHONE (OUTPATIENT)
Dept: INTERNAL MEDICINE | Facility: CLINIC | Age: 69
End: 2024-05-13
Payer: MEDICARE

## 2024-05-13 DIAGNOSIS — E78.5 DYSLIPIDEMIA: ICD-10-CM

## 2024-05-13 DIAGNOSIS — E53.8 B12 DEFICIENCY: Primary | ICD-10-CM

## 2024-05-13 NOTE — TELEPHONE ENCOUNTER
Caller: Colby Santillan    Relationship: Self    Best call back number: [phone]     What orders are you requesting (i.e. lab or imaging): BLOOD WORK FOR [facility]    In what timeframe would the patient need to come in: BEFORE JULY 24TH APPOINTMENT     Where will you receive your lab/imaging services: OFFICE    Additional notes: PLEASE CALL AND SCHEDULE LAB APPOINTMENT ONCE ORDERS ARE PLACED

## 2024-07-24 ENCOUNTER — OFFICE VISIT (OUTPATIENT)
Dept: INTERNAL MEDICINE | Facility: CLINIC | Age: 69
End: 2024-07-24
Payer: MEDICARE

## 2024-07-24 VITALS
OXYGEN SATURATION: 99 % | HEIGHT: 73 IN | WEIGHT: 180 LBS | BODY MASS INDEX: 23.86 KG/M2 | DIASTOLIC BLOOD PRESSURE: 65 MMHG | RESPIRATION RATE: 18 BRPM | HEART RATE: 67 BPM | SYSTOLIC BLOOD PRESSURE: 115 MMHG

## 2024-07-24 DIAGNOSIS — Z00.00 MEDICARE ANNUAL WELLNESS VISIT, SUBSEQUENT: Primary | ICD-10-CM

## 2024-07-24 PROCEDURE — G0439 PPPS, SUBSEQ VISIT: HCPCS | Performed by: FAMILY MEDICINE

## 2024-07-24 PROCEDURE — 1170F FXNL STATUS ASSESSED: CPT | Performed by: FAMILY MEDICINE

## 2024-07-24 PROCEDURE — 1159F MED LIST DOCD IN RCRD: CPT | Performed by: FAMILY MEDICINE

## 2024-07-24 PROCEDURE — 1160F RVW MEDS BY RX/DR IN RCRD: CPT | Performed by: FAMILY MEDICINE

## 2024-07-24 PROCEDURE — 1126F AMNT PAIN NOTED NONE PRSNT: CPT | Performed by: FAMILY MEDICINE

## 2024-07-24 NOTE — PROGRESS NOTES
Subjective   The ABCs of the Annual Wellness Visit  Medicare Wellness Visit      Colby Santillan is a 68 y.o. patient who presents for a Medicare Wellness Visit.    The following portions of the patient's history were reviewed and   updated as appropriate: allergies, current medications, past family history, past medical history, past social history, past surgical history, and problem list.    Compared to one year ago, the patient's physical   health is the same.  Compared to one year ago, the patient's mental   health is the same.    Recent Hospitalizations:  He was not admitted to the hospital during the last year.     Current Medical Providers:  Patient Care Team:  Nikhil Salvador MD as PCP - General (Family Medicine)  Prudence Davis MD (Psychiatry)  Luna Way MD as Consulting Physician (Dermatology)  Jack Padilla MD as Surgeon (General Surgery)    Outpatient Medications Prior to Visit   Medication Sig Dispense Refill    vitamin B-12 (CYANOCOBALAMIN) 1000 MCG tablet Take 1 tablet by mouth Daily.      multivitamin with minerals tablet tablet        No facility-administered medications prior to visit.     No opioid medication identified on active medication list. I have reviewed chart for other potential  high risk medication/s and harmful drug interactions in the elderly.      Aspirin is not on active medication list.  Aspirin use is not indicated based on review of current medical condition/s. Risk of harm outweighs potential benefits.  .    Patient Active Problem List   Diagnosis    Conductive hearing loss of both ears    Folliculitis    History of colon cancer, stage III    Urinary frequency    Benign essential tremor    History of skin cancer    Tremor    Change in bowel habit    B12 deficiency     Advance Care Planning (Click this link to access ACP Navigator)  Advance Directive is not on file.  ACP discussion was held with the patient during this visit. Patient has an advance directive (not  "in EMR), copy requested.        Objective   Vitals:    24 0926   BP: 115/65   BP Location: Left arm   Patient Position: Sitting   Cuff Size: Small Adult   Pulse: 67   Resp: 18   SpO2: 99%   Weight: 81.6 kg (180 lb)   Height: 185.4 cm (73\")       Estimated body mass index is 23.75 kg/m² as calculated from the following:    Height as of this encounter: 185.4 cm (73\").    Weight as of this encounter: 81.6 kg (180 lb).    BMI is within normal parameters. No other follow-up for BMI required.    Physical Exam  Vitals and nursing note reviewed.   Constitutional:       General: He is not in acute distress.     Appearance: Normal appearance.   Cardiovascular:      Rate and Rhythm: Normal rate and regular rhythm.      Heart sounds: Normal heart sounds. No murmur heard.  Pulmonary:      Effort: Pulmonary effort is normal.      Breath sounds: Normal breath sounds.   Neurological:      Mental Status: He is alert.      Gait: Gait is intact.                 Does the patient have evidence of cognitive impairment? No  Lab Results   Component Value Date    CHLPL 149 2024    TRIG 71 2024    HDL 55 2024    LDL 80 2024    VLDL 14 2024                                                                                               Health  Risk Assessment    Smoking Status:  Social History     Tobacco Use   Smoking Status Never   Smokeless Tobacco Never     Alcohol Consumption:  Social History     Substance and Sexual Activity   Alcohol Use Yes    Alcohol/week: 1.0 standard drink of alcohol    Types: 1 Cans of beer per week    Comment: Infrequent     Fall Risk Screen:  AYAHADI Fall Risk Assessment was completed, and patient is at LOW risk for falls.Assessment completed on:2024    Depression Screenin/24/2024     9:27 AM   PHQ-2/PHQ-9 Depression Screening   Little Interest or Pleasure in Doing Things 0-->not at all   Feeling Down, Depressed or Hopeless 0-->not at all   PHQ-9: Brief Depression " Severity Measure Score 0     Health Habits and Functional and Cognitive Screenin/24/2024     8:01 AM   Functional & Cognitive Status   Do you have difficulty preparing food and eating? No   Do you have difficulty bathing yourself, getting dressed or grooming yourself? No   Do you have difficulty using the toilet? No   Do you have difficulty moving around from place to place? No   Do you have trouble with steps or getting out of a bed or a chair? No   Current Diet Well Balanced Diet   Dental Exam Up to date   Eye Exam Up to date   Exercise (times per week) 5 times per week   Current Exercises Include Gardening;Walking;Weightlifting;Yard Work   Do you need help using the phone?  No   Are you deaf or do you have serious difficulty hearing?  No   Do you need help to go to places out of walking distance? No   Do you need help shopping? No   Do you need help preparing meals?  No   Do you need help with housework?  No   Do you need help with laundry? No   Do you need help taking your medications? No   Do you need help managing money? No   Do you ever drive or ride in a car without wearing a seat belt? No   Have you felt unusual stress, anger or loneliness in the last month? No   Who do you live with? Spouse   If you need help, do you have trouble finding someone available to you? No   Have you been bothered in the last four weeks by sexual problems? No   Do you have difficulty concentrating, remembering or making decisions? No             Age-appropriate Screening Schedule:  Refer to the list below for future screening recommendations based on patient's age, sex and/or medical conditions. Orders for these recommended tests are listed in the plan section. The patient has been provided with a written plan.    Health Maintenance List  Health Maintenance   Topic Date Due    ZOSTER VACCINE (1 of 2) Never done    Pneumococcal Vaccine 65+ (1 of 1 - PCV) Never done    COVID-19 Vaccine (2023- season) 2023     ANNUAL WELLNESS VISIT  01/09/2024    COLORECTAL CANCER SCREENING  04/25/2028    TDAP/TD VACCINES (2 - Tdap) 07/09/2032    HEPATITIS C SCREENING  Completed    INFLUENZA VACCINE  Discontinued                                                                                                                                                Common labs          8/21/2023    11:00 7/17/2024    09:03   Common Labs   Glucose  101    BUN  11    Creatinine  0.71    Sodium  139    Potassium  4.5    Chloride  104    Calcium  9.8    Total Protein  6.5    Albumin  4.4    Total Bilirubin  0.6    Alkaline Phosphatase  99    AST (SGOT)  15    ALT (SGPT)  11    WBC  4.81    Hemoglobin  14.7    Hematocrit  43.7    Platelets  226    Total Cholesterol  149    Triglycerides  71    HDL Cholesterol  55    LDL Cholesterol   80    PSA 3.900           CMS Preventative Services Quick Reference  Risk Factors Identified During Encounter  Immunizations Discussed/Encouraged: Shingrix    The above risks/problems have been discussed with the patient.  Pertinent information has been shared with the patient in the After Visit Summary.  An After Visit Summary and PPPS were made available to the patient.    Follow Up:   Next Medicare Wellness visit to be scheduled in 1 year.     Assessment & Plan  Medicare annual wellness visit, subsequent               Follow Up:   Return in about 5 weeks (around 8/28/2024).

## 2024-07-24 NOTE — PATIENT INSTRUCTIONS
"MyChart Tips:    [email or web address] can be a useful part of our patient care program and is a way for us to communicate lab results and for you to request refills.  Here is some information regarding the best way to use [facility] for communication.       Examples of medical issues that are APPROPRIATE for [email or web address]:  -Follow-up on problems we have already addressed in a visit such as home testing results, blood pressure readings, glucose readings  -Questions that can be answered with a simple \"yes\" or \"no\"  -Please keep communication concise and to the point.  All other types of communication should be held for an office visit.    Communication that is NOT APPROPRIATE for [email or web address]:  -New problems, serious problems or urgent problems.  Urgent matters should be addressed by phone for advice, in the office, urgent care or the ER.  -Requesting Paxlovid or Antibiotics: These types of problems require an evaluation unless your provider approved this previously.    -Chief Trunk messages are not email.  Staff will check messages each weekday.  We strive for a 48-hour turnaround on messaging.    -Chief Trunk is not for private issues.  Messages are received first by our office staff.    Please be mindful that sometimes it may take longer to receive a response on Chief Trunk.  Many times of the year we have high volumes of messages coming into physician inboxes.      Please also be mindful that Chief Trunk messages become part of your permanent medical record.    We appreciate your respect for the limitations and boundaries of Chief Trunk.      Lab Results:  Please allow up to 7 business days for lab results to be sent through Chief Trunk to you before contacting your provider.  Sometimes we are waiting for results to get back from the lab and also your provider needs time to analyze them thoroughly before making recommendations.  Also, providers may be out of the office on vacation.      While the internet has great resources, it is not a substitute for " interpreting lab results.

## 2024-08-05 ENCOUNTER — TELEPHONE (OUTPATIENT)
Dept: INTERNAL MEDICINE | Facility: CLINIC | Age: 69
End: 2024-08-05
Payer: MEDICARE

## 2024-08-05 NOTE — TELEPHONE ENCOUNTER
Scheduled patient with Dr Salvador on Friday. Patient asking Dr Salvador to review medication sent in from  this morning to be sure it is appropriate to take.

## 2024-08-05 NOTE — TELEPHONE ENCOUNTER
Caller: Colby Santillan    Relationship: Self    Best call back number: [phone] (Mobile)     What is the medical concern/diagnosis: FROZEN SHOULDER    What specialty or service is being requested: PHYSICAL THERAPY     What is the provider, practice or medical service name: PREVIOUSLY USED [facility], NOT SURE IF THEY'RE IN NETWORK WITH HIS INSURANCE.

## 2024-08-09 ENCOUNTER — PATIENT ROUNDING (BHMG ONLY) (OUTPATIENT)
Dept: URGENT CARE | Facility: CLINIC | Age: 69
End: 2024-08-09
Payer: MEDICARE

## 2024-08-09 ENCOUNTER — OFFICE VISIT (OUTPATIENT)
Dept: INTERNAL MEDICINE | Facility: CLINIC | Age: 69
End: 2024-08-09
Payer: MEDICARE

## 2024-08-09 VITALS
OXYGEN SATURATION: 100 % | HEIGHT: 73 IN | SYSTOLIC BLOOD PRESSURE: 122 MMHG | RESPIRATION RATE: 18 BRPM | WEIGHT: 180 LBS | HEART RATE: 68 BPM | BODY MASS INDEX: 23.86 KG/M2 | DIASTOLIC BLOOD PRESSURE: 82 MMHG

## 2024-08-09 DIAGNOSIS — M54.2 NECK PAIN, MUSCULOSKELETAL: Primary | ICD-10-CM

## 2024-08-09 PROCEDURE — 1159F MED LIST DOCD IN RCRD: CPT | Performed by: FAMILY MEDICINE

## 2024-08-09 PROCEDURE — 99213 OFFICE O/P EST LOW 20 MIN: CPT | Performed by: FAMILY MEDICINE

## 2024-08-09 PROCEDURE — 1160F RVW MEDS BY RX/DR IN RCRD: CPT | Performed by: FAMILY MEDICINE

## 2024-08-09 PROCEDURE — G2211 COMPLEX E/M VISIT ADD ON: HCPCS | Performed by: FAMILY MEDICINE

## 2024-08-09 PROCEDURE — 1125F AMNT PAIN NOTED PAIN PRSNT: CPT | Performed by: FAMILY MEDICINE

## 2024-08-09 NOTE — PROGRESS NOTES
Chief Complaint  Shoulder Pain    Subjective        Colby Santillan presents to [facility]  History of Present Illness    Answers submitted by the patient for this visit:  Primary Reason for Visit (Submitted on 8/7/2024)  What is the primary reason for your visit?: Back Pain  Back Pain Questionnaire (Submitted on 8/7/2024)  Chief Complaint: Back pain  Chronicity: new  Onset: in the past 7 days  Frequency: constantly  Progression since onset: improving  Pain location: thoracic spine  Pain quality: stabbing  Pain - numeric: 8/10  Pain is: worse during the day  Aggravated by: position  Stiffness is present: all day  abdominal pain: No  bladder incontinence: No  bowel incontinence: No  chest pain: No  dysuria: No  fever: No  leg pain: No  numbness: No  paresthesias: No  pelvic pain: No  perianal numbness: No  tingling: No  weakness: No  weight loss: No  Risk factors: history of cancer  Additional Information: Located on my left side, radiating from my neck to my shoulder down into my forearm  I reviewed the information submitted by the patient.  After doing some research she thinks he may have been working out too hard which triggered the event.  So far he has been treated with baclofen, Medrol Dosepak and diclofenac from the urgent care.  He also went to get an opinion from his chiropractor who suggested some exercise to help for a possible pinched nerve which has been helping him and he feels about 60% improved with symptoms.      Current Outpatient Medications:     baclofen (LIORESAL) 10 MG tablet, Take 1 tablet by mouth 3 (Three) Times a Day As Needed for Muscle Spasms for up to 14 days., Disp: 30 tablet, Rfl: 0    diclofenac (VOLTAREN) 75 MG EC tablet, Take 1 tablet by mouth 2 (Two) Times a Day for 14 days., Disp: 28 tablet, Rfl: 0    methylPREDNISolone (MEDROL) 4 MG dose pack, Take as directed on package instructions., Disp: 21 tablet, Rfl: 0    vitamin B-12 (CYANOCOBALAMIN) 1000 MCG  "tablet, Take 1 tablet by mouth Daily., Disp: , Rfl:       Objective   Vital Signs:  /82 (BP Location: Left arm, Patient Position: Sitting, Cuff Size: Small Adult)   Pulse 68   Resp 18   Ht 185.4 cm (73\")   Wt 81.6 kg (180 lb)   SpO2 100%   BMI 23.75 kg/m²   Estimated body mass index is 23.75 kg/m² as calculated from the following:    Height as of this encounter: 185.4 cm (73\").    Weight as of this encounter: 81.6 kg (180 lb).       BMI is within normal parameters. No other follow-up for BMI required.      Physical Exam  Vitals and nursing note reviewed.   Constitutional:       Appearance: Normal appearance.   Musculoskeletal:      Right shoulder: Normal.      Left shoulder: No deformity, tenderness or bony tenderness. Normal range of motion.      Cervical back: Spasms and tenderness present. No bony tenderness. Pain with movement present. Decreased range of motion.        Result Review :    The following data was reviewed by: Nikhil Salvador MD on 08/09/2024:    Data reviewed : Urgent care note 8/5/2024             Assessment and Plan     Diagnoses and all orders for this visit:    1. Neck pain, musculoskeletal (Primary)  -     Ambulatory Referral to Physical Therapy      I would recommend continuing the baclofen and diclofenac as needed for muscle spasms and inflammation.  Finish off the Medrol Dosepak.  I explained to him that the mainstay of treatment would be physical therapy and possibly dry needling given that he looks like he has some trigger point areas.  I will place a referral down to our physical therapy department.  I will also give him some neck exercises in his AVS.  I think I would say that he least has a neck strain with possible pinching of cervical nerves.  Hopefully this treatment will make him feel a lot better.           Follow Up     Return if symptoms worsen or fail to improve.  Patient was given instructions and counseling regarding his condition or for health maintenance " advice. Please see specific information pulled into the AVS if appropriate.

## 2024-08-09 NOTE — ED NOTES
Thank you for letting us care for you in your recent visit to our urgent care center. We would love to hear about your experience with us. Was this the first time you have visited our location?    We’re always looking for ways to make our patients’ experiences even better. Do you have any recommendations on ways we may improve?     I appreciate you taking the time to respond. Please be on the lookout for a survey about your recent visit from [employer] via text or email. We would greatly appreciate if you could fill that out and turn it back in. We want your voice to be heard and we value your feedback.   Thank you for choosing [facility] for your healthcare needs.

## 2024-08-13 ENCOUNTER — TELEPHONE (OUTPATIENT)
Dept: INTERNAL MEDICINE | Facility: CLINIC | Age: 69
End: 2024-08-13
Payer: MEDICARE

## 2024-08-13 NOTE — TELEPHONE ENCOUNTER
Dr. Salvador informed pt if he wanted to change where to go for PT, he could just call. Pt called and stated he would like to go to [facility], [address] [facility] [address] [id number]; [phone]. Re-routing to [facility].

## 2024-08-20 DIAGNOSIS — M25.512 ACUTE PAIN OF LEFT SHOULDER: ICD-10-CM

## 2024-08-20 DIAGNOSIS — M62.838 MUSCLE SPASM: ICD-10-CM

## 2024-08-20 NOTE — TELEPHONE ENCOUNTER
Caller: Colby Santillan    Relationship: Self    Best call back number: [phone]     Requested Prescriptions:   Requested Prescriptions     Pending Prescriptions Disp Refills    diclofenac (VOLTAREN) 75 MG EC tablet 28 tablet 0     Sig: Take 1 tablet by mouth 2 (Two) Times a Day for 14 days.        Pharmacy where request should be sent: [facility]ARMACY #[id number] - [address] [phone] [facility] [phone]      Last office visit with prescribing clinician: 8/9/2024   Last telemedicine visit with prescribing clinician: Visit date not found   Next office visit with prescribing clinician: Visit date not found     Additional details provided by patient:     Does the patient have less than a 3 day supply:  [x] Yes  [] No    Would you like a call back once the refill request has been completed: [] Yes [] No    If the office needs to give you a call back, can they leave a voicemail: [] Yes [] No    April Ahmet Braxton Rep   08/20/24 13:28 EDT

## 2024-08-21 RX ORDER — DICLOFENAC SODIUM 75 MG/1
75 TABLET, DELAYED RELEASE ORAL 2 TIMES DAILY PRN
Qty: 60 TABLET | Refills: 0 | Status: SHIPPED | OUTPATIENT
Start: 2024-08-21

## 2024-08-28 ENCOUNTER — OFFICE VISIT (OUTPATIENT)
Dept: INTERNAL MEDICINE | Facility: CLINIC | Age: 69
End: 2024-08-28
Payer: MEDICARE

## 2024-08-28 VITALS
HEIGHT: 73 IN | DIASTOLIC BLOOD PRESSURE: 80 MMHG | OXYGEN SATURATION: 98 % | WEIGHT: 177 LBS | SYSTOLIC BLOOD PRESSURE: 118 MMHG | HEART RATE: 69 BPM | BODY MASS INDEX: 23.46 KG/M2

## 2024-08-28 DIAGNOSIS — G25.2 DYSTONIC TREMOR: Chronic | ICD-10-CM

## 2024-08-28 DIAGNOSIS — Z85.038 HISTORY OF COLON CANCER, STAGE III: Chronic | ICD-10-CM

## 2024-08-28 DIAGNOSIS — E53.8 B12 DEFICIENCY: Chronic | ICD-10-CM

## 2024-08-28 DIAGNOSIS — M54.2 CERVICALGIA: Primary | ICD-10-CM

## 2024-08-28 DIAGNOSIS — Z85.828 HISTORY OF SKIN CANCER: Chronic | ICD-10-CM

## 2024-08-28 DIAGNOSIS — H90.0 CONDUCTIVE HEARING LOSS OF BOTH EARS: Chronic | ICD-10-CM

## 2024-08-28 PROBLEM — R35.0 URINARY FREQUENCY: Chronic | Status: RESOLVED | Noted: 2022-03-28 | Resolved: 2024-08-28

## 2024-08-28 PROBLEM — R19.4 CHANGE IN BOWEL HABIT: Status: RESOLVED | Noted: 2023-03-31 | Resolved: 2024-08-28

## 2024-08-28 PROCEDURE — G2211 COMPLEX E/M VISIT ADD ON: HCPCS | Performed by: NURSE PRACTITIONER

## 2024-08-28 PROCEDURE — 1160F RVW MEDS BY RX/DR IN RCRD: CPT | Performed by: NURSE PRACTITIONER

## 2024-08-28 PROCEDURE — 1159F MED LIST DOCD IN RCRD: CPT | Performed by: NURSE PRACTITIONER

## 2024-08-28 PROCEDURE — 99214 OFFICE O/P EST MOD 30 MIN: CPT | Performed by: NURSE PRACTITIONER

## 2024-08-28 PROCEDURE — 1125F AMNT PAIN NOTED PAIN PRSNT: CPT | Performed by: NURSE PRACTITIONER

## 2024-08-28 RX ORDER — DICLOFENAC SODIUM 75 MG/1
75 TABLET, DELAYED RELEASE ORAL 2 TIMES DAILY
COMMUNITY

## 2024-08-28 NOTE — PROGRESS NOTES
"Chief Complaint  Nerve Issue    Subjective        Colby Santillan presents to [facility]  History of Present Illness  This is a 67 y/o male presenting to office to establish care and for chronic health management. Patient is currently working for non profit. He is currently .     Reports he exercises regularly.     Prev seen Dr. Davis for concerns of intermittent left hand tremor-- Jesenia scan neg; has f/u scheduled in January 2025.     Reports he is seeing physical therapy for cervicalgia; reports he has had 5 visits with PT; ongoing symptoms x 4 weeks; reports he is not having improvement in his left neck pain with his radiculopathy symptoms down left arm. Continues on diclofenac BID; reports he is using this for pain relief. Reports pain flares at night and with specific movements. Denies weakness but does have hx of dystonic tremor in left hand.     Hx b12 deficiency-- continues on b12 supplementation.     Colonoscopy due in 2028; sees Dr. Padilla. Hx colon cax    Objective   Vital Signs:  /80 (BP Location: Left arm, Patient Position: Sitting, Cuff Size: Adult)   Pulse 69   Ht 185.4 cm (73\")   Wt 80.3 kg (177 lb)   SpO2 98%   BMI 23.35 kg/m²   Estimated body mass index is 23.35 kg/m² as calculated from the following:    Height as of this encounter: 185.4 cm (73\").    Weight as of this encounter: 80.3 kg (177 lb).    BMI is within normal parameters. No other follow-up for BMI required.      Physical Exam  Constitutional:       Appearance: Normal appearance.   HENT:      Head: Normocephalic and atraumatic.      Right Ear: External ear normal.      Left Ear: External ear normal.      Nose: Nose normal.      Mouth/Throat:      Mouth: Mucous membranes are moist.      Pharynx: Oropharynx is clear.   Eyes:      Conjunctiva/sclera: Conjunctivae normal.      Pupils: Pupils are equal, round, and reactive to light.   Cardiovascular:      Rate and Rhythm: Normal rate and " regular rhythm.      Pulses: Normal pulses.      Heart sounds: Normal heart sounds. No murmur heard.     No gallop.   Pulmonary:      Effort: Pulmonary effort is normal. No respiratory distress.      Breath sounds: Normal breath sounds. No stridor. No wheezing, rhonchi or rales.   Musculoskeletal:         General: Tenderness present.      Cervical back: Spasms and tenderness present. Decreased range of motion.   Skin:     General: Skin is warm and dry.   Neurological:      Mental Status: He is alert and oriented to person, place, and time. Mental status is at baseline.   Psychiatric:         Mood and Affect: Mood normal.         Thought Content: Thought content normal.         Judgment: Judgment normal.        Result Review :  The following data was reviewed by: JAMES Romero on 08/28/2024:  Common labs          7/17/2024    09:03   Common Labs   Glucose 101    BUN 11    Creatinine 0.71    Sodium 139    Potassium 4.5    Chloride 104    Calcium 9.8    Total Protein 6.5    Albumin 4.4    Total Bilirubin 0.6    Alkaline Phosphatase 99    AST (SGOT) 15    ALT (SGPT) 11    WBC 4.81    Hemoglobin 14.7    Hematocrit 43.7    Platelets 226    Total Cholesterol 149    Triglycerides 71    HDL Cholesterol 55    LDL Cholesterol  80      Tobacco Use: Low Risk  (8/28/2024)    Patient History     Smoking Tobacco Use: Never     Smokeless Tobacco Use: Never     Passive Exposure: Not on file     Social History     Substance and Sexual Activity   Alcohol Use Yes    Alcohol/week: 1.0 standard drink of alcohol    Types: 1 Cans of beer per week    Comment: Infrequent             Assessment and Plan   Diagnoses and all orders for this visit:    1. Cervicalgia (Primary)  -     MRI Cervical Spine Without Contrast; Future    2. Dystonic tremor  Assessment & Plan:  Following with Dr. Freeman      3. B12 deficiency  Assessment & Plan:  Continues on b12 supplementation       4. History of skin cancer  Assessment & Plan:  Follows with   [facility]      5. History of colon cancer, stage III  Assessment & Plan:  Sees Dr. Padilla for colonoscopy  On q5 year recall, due in 2028      6. Conductive hearing loss of both ears  Assessment & Plan:  Using hearing aids               Follow Up   Return in about 11 months (around 7/28/2025) for Medicare Wellness.  Patient was given instructions and counseling regarding his condition or for health maintenance advice. Please see specific information pulled into the AVS if appropriate.

## 2024-09-25 ENCOUNTER — PATIENT MESSAGE (OUTPATIENT)
Dept: INTERNAL MEDICINE | Facility: CLINIC | Age: 69
End: 2024-09-25
Payer: MEDICARE

## 2024-09-25 DIAGNOSIS — M54.2 CERVICALGIA: Primary | ICD-10-CM

## 2024-10-02 ENCOUNTER — OFFICE VISIT (OUTPATIENT)
Dept: ORTHOPEDIC SURGERY | Facility: CLINIC | Age: 69
End: 2024-10-02
Payer: MEDICARE

## 2024-10-02 VITALS — BODY MASS INDEX: 21.98 KG/M2 | WEIGHT: 176.8 LBS | HEIGHT: 75 IN | TEMPERATURE: 97.1 F

## 2024-10-02 DIAGNOSIS — M54.12 CERVICAL RADICULOPATHY AT C7: Primary | ICD-10-CM

## 2024-10-02 PROCEDURE — 99213 OFFICE O/P EST LOW 20 MIN: CPT | Performed by: NURSE PRACTITIONER

## 2024-10-02 PROCEDURE — 1160F RVW MEDS BY RX/DR IN RCRD: CPT | Performed by: NURSE PRACTITIONER

## 2024-10-02 PROCEDURE — 1159F MED LIST DOCD IN RCRD: CPT | Performed by: NURSE PRACTITIONER

## 2024-10-02 NOTE — PROGRESS NOTES
Patient Name: Colby Santillan   YOB: 1955  Referring Primary Care Physician: Cathleen Kebede APRN      Chief Complaint:    Chief Complaint   Patient presents with    Cervical Spine - Pain, Initial Evaluation        Previous Treatment:     PT for neck pain? Yes [facility]-month ago (7 session)    If yes was it effective? No    MRI:   09/23/2024 - MRI of C-Spine W/O ([facility])     Neck Pain   This is a chronic problem. The current episode started more than 1 month ago. The problem occurs intermittently. The problem has been gradually improving. The pain is associated with nothing. The pain is present in the left side (LUE). The quality of the pain is described as aching. The pain is at a severity of 2/10. The pain is mild. The symptoms are aggravated by position and twisting. The pain is Same all the time. Associated symptoms include tingling. Pertinent negatives include no weakness. He has tried heat and ice for the symptoms. The treatment provided mild relief.        HPI:  Colby Santillan is a 68 y.o. male who presents to [facility] for evaluation of above complaints.  Primarily complaining of neck pain referring down the left upper extremity with tingling in the second and third digits of the left hand.  Symptoms started in August without specific injury.  Pain was quite intense initially, he did present to urgent care 08/05/2024.  He was given muscle relaxants and anti-inflammatories as well as Medrol Dosepak.  He was referred to physical therapy and has also been seeing a chiropractor.  Although symptoms have improved, he does state that on the nights of physical therapy, he had increase in the left arm pain.  No imbalance or incoordination.  This is an established patient of practice as seeing JAMES Pelayo for knee pain, new to me.  He is unaccompanied today.  Prior pertinent records were reviewed.    PFSH:  See attached    ROS: As per HPI, otherwise  negative    Objective:      68 y.o. male  Body mass index is 22.1 kg/m²., 80.2 kg (176 lb 12.8 oz), @HT@  Vitals:    10/02/24 1407   Temp: 97.1 °F (36.2 °C)     Pain Score    10/02/24 1407   PainSc:   2   PainLoc: Neck            Spine Musculoskeletal Exam    Gait    Gait is normal.    Inspection    Coronal balance: no imbalance    Sagittal balance: no imbalance    Palpation    Cervical Spine    Tenderness: present      Paraspinous: left    Strength    Cervical Spine    Cervical spine motor exam is normal.    Sensory    Cervical Spine    Cervical spine sensation is normal.    Spine sensation additional comments: Subjective paresthesia left C7 dermatome    Reflexes    Right        Biceps: 1/4      Brachioradialis: 1/4      Triceps: 1/4    Left        Biceps: 1/4        Brachioradialis: 1/4      Triceps: 1/4    Special Tests    Cervical Spine      Right      Tinel's - cubital tunnel: negative      Left      Tinel's - cubital tunnel: negative    General      Constitutional: well-developed and well-nourished    Scleral icterus: no    Labored breathing: no    Psychiatric: normal mood and affect and no acute distress    Neurological: alert and oriented x3    Skin: intact        IMAGING:       No imaging in office today.  Cervical MRI at [facility] 09/23/2024 was reviewed with the patient reveals multilevel degenerative disc and facet changes with more severe left-sided facet arthropathy C3-4 and disc osteophyte more eccentric to the left in combination with uncovertebral hypertrophy contributing to moderate to severe left foraminal narrowing at this level, agree with report.  Assessment:           Diagnoses and all orders for this visit:    1. Cervical radiculopathy at C7 (Primary)  -     Ambulatory Referral to Physical Therapy for Evaluation & Treatment             Plan:  He is most likely symptomatic from the pathology at C6-7.  He was going through physical therapy but it tended to aggravate the symptoms, but this was  before having the cervical MRI.  At this point we will get back into physical therapy through [facility].  He has a trip traction device that he is viral from his chiropractor and can take that with him to make sure he is utilizing it correctly and using the appropriate pounds of pressure, which he says he did with his previous therapist.  Would recommend against any manipulations of the cervical spine through the chiropractor and he does report his chiropractor does not generally do those manipulations.  Next level of treatment could be a trial of cervical epidural injections, but he has no loss of nerve function on exam today and symptoms have improved over time so we will hold off on that for now.  If he wants to consider that in the next few weeks, he can notify the office and we will refer to [facility] at that point.  He understands agrees with plan.        Return if symptoms worsen or fail to improve.    EMR Dragon/Transcription Disclaimer:   Much of this encounter note is an electronic transcription/translation of spoken language to printed text. The electronic translation of spoken language may permit erroneous, or at times, nonsensical words or phrases to be inadvertently transcribed; Although I have reviewed the note for such errors, some may still exist.  Red flags have been discussed at this or previous visits to include but not limited weakness in extremities, worsening pain that does not respond to conservative treatment and bowel or bladder dysfunction. These are reasons to present to ER and patient has been informed.    The diagnosis(es), natural history, pathophysiology and treatment for diagnosis(es) were discussed. Opportunity given and questions answered. Biomechanics of pertinent body areas discussed.    EXERCISES:  Advice on benefits of, and types of regular/moderate exercise pertaining to diagnosis.  Continue HEP. For back or neck pain, recommend pilates and or yoga as tolerated.  Generally it is best to start any new exercise under the guidance of a  or therapist.   MEDICATIONS:  When prescribe, the risks, benefits, warnings,side effects and alternatives of medications discussed. Advised against long term use of narcotics.   PAIN CONTROL:  Cold, heat, OTC lidocaine patches and/or ointment as needed. Avoid direct skin contact with ice. Ice 15-20 minutes 3-4 times daily as needed. For SI joint pain, recommend ice bath in water about 50 degrees for 5 consecutive days, add ice slowly to help with adjustment and may cover with warm towel or robe to help with cold tolerance. If using lidocaine, do not apply heat in conjunction as this can cause a burn.   MEDICAL RECORDS reviewed from other provider(s) for past and current medical history pertinent to this visit..

## 2024-10-25 ENCOUNTER — HOSPITAL ENCOUNTER (OUTPATIENT)
Dept: PHYSICAL THERAPY | Facility: HOSPITAL | Age: 69
Discharge: HOME | End: 2024-10-25
Admitting: NURSE PRACTITIONER
Payer: MEDICARE

## 2024-10-25 DIAGNOSIS — M79.2 RADICULAR PAIN IN LEFT ARM: ICD-10-CM

## 2024-10-25 DIAGNOSIS — M54.2 NECK PAIN ON LEFT SIDE: Primary | ICD-10-CM

## 2024-10-25 PROCEDURE — 97161 PT EVAL LOW COMPLEX 20 MIN: CPT

## 2024-10-25 NOTE — THERAPY EVALUATION
"  Outpatient Physical Therapy Ortho Initial Evaluation  [facility]     Patient Name: Colby Santillan  : 1955  MRN: [id number]  Today's Date: 10/25/2024      Visit Date: 10/25/2024    Patient Active Problem List   Diagnosis    Conductive hearing loss of both ears    Folliculitis    History of colon cancer, stage III    Benign essential tremor    History of skin cancer    Dystonic tremor    B12 deficiency        Past Medical History:   Diagnosis Date    Anesthesia complication     PATIENT REQUEST \"MINIMAL SEDATION\" NOT FOR ANY PROBLEMS JUST PREFERENCE    Benign prostatic hyperplasia 2019    Cancer 2004    colon cancer    Colon cancer Diagnosis in     See above    Colon polyp 2004    Constipation     WAS IN THE ER PREVIOUSLY FOR THIS..HAD CT SCAN AT THIS TIME    Hernia 23    Noted in c-scan    History of chemotherapy     STATES ONE TREATMENT FOR COLON CANCER. UNABLE TO TOLERATE    HL (hearing loss) 2000    WITH YRIS HEARING AIDS    Lactose intolerance     Eliminated dairy from my diet    Rectal bleeding     Immediately had a colonoscopy, which came back negative (erroneously).  Had second colonoscopy in  which confirmed colon cancer.    Skin cancer     REMOVED FROM FACE AND CHEST    Tremor 2010        Past Surgical History:   Procedure Laterality Date    ABDOMINAL SURGERY      resection for colon cancer    APPENDECTOMY  2004    Doctor decided to remove during cancer surgery (colon resection)    COLON SURGERY  2004    Colon resection for cancer    COLONOSCOPY  Latest:     COLONOSCOPY N/A 2023    Procedure: COLONOSCOPY into cecum;  Surgeon: Jack Padilla MD;  Location: [facility] ENDOSCOPY;  Service: Gastroenterology;  Laterality: N/A;    FRACTURE SURGERY Left 1985    HARDWARE SINCE REMOVED    VASECTOMY  10/1/1994       Visit Dx:     ICD-10-CM ICD-9-CM   1. Neck pain on left side  M54.2 723.1   2. Radicular " pain in left arm  M79.2 729.2          Patient History       Row Name 10/25/24 0700             History    Chief Complaint Pain;Tinglings  -CS      Type of Pain Neck pain;Upper Extremity / Arm  -CS      Date Current Problem(s) Began --  August 2024  -CS      Brief Description of Current Complaint Pt reports while he was exercising in August of this year, he tried a new exercise and the next day he began having sharp/stabbing pain in his neck with subsequent radicular symptoms in his LUE. Since then he reports he is about 90% better overall but still has tingling into the tips of his 2nd and 3rd digits of his LUE. He was receiving [facility] at [facility] but stopped going because he was having increased pain every day after his sessions. Pt has an at home traction device, does not report much relief following. Pt's symptoms are only brought on during moments of poor posture  -CS      Previous treatment for THIS PROBLEM Other (comment)  [facility]  -CS      Patient/Caregiver Goals Relieve pain  -CS      Patient/Caregiver Goals Comment Decreased tingling  -CS      Hand Dominance right-handed  -CS      Occupation/sports/leisure activities Hike, walk, exercise  -CS      What clinical tests have you had for this problem? MRI  -CS      Results of Clinical Tests See at Epic  -CS         Pain     Pain Location Hand;Neck  -CS      Pain at Present 0  -CS      Pain at Best 0  -CS      Pain at Worst 1  -CS      What Performance Factors Make the Current Problem(s) WORSE? Forward head posture, rounded shoulders  -CS      What Performance Factors Make the Current Problem(s) BETTER? Positional changes  -CS      Tolerance Time- Standing None  -CS      Tolerance Time- Sitting None  -CS      Tolerance Time- Walking None  -CS      Is your sleep disturbed? No  -CS      Difficulties at work? None  -CS      Difficulties with ADL's? None  -CS      Difficulties with recreational activities? None  -CS         Fall Risk Assessment    Any falls in the  past year: No  -CS         Services    Prior Rehab/Home Health Experiences No  -CS      Are you currently receiving Home Health services No  -CS      Do you plan to receive Home Health services in the near future No  -CS         Daily Activities    Primary Language English  -CS      Are you able to read Yes  -CS      Are you able to write Yes  -CS      How does patient learn best? Listening;Reading;Demonstration;Pictures/Video  -CS      Does patient have problems with the following? None  -CS      Barriers to learning None  -CS      Pt Participated in POC and Goals Yes  -CS         Safety    Are you being hurt, hit, or frightened by anyone at home or in your life? No  -CS      Are you being neglected by a caregiver No  -CS      Have you had any of the following issues with N/A  -CS                User Key  (r) = Recorded By, (t) = Taken By, (c) = Cosigned By      Initials Name Provider Type    CS Leon Mcgregor PT Physical Therapist                     PT Ortho       Row Name 10/25/24 0700       Subjective    Subjective Comments Eval  -CS       Subjective Pain    Able to rate subjective pain? yes  -CS    Pre-Treatment Pain Level 0  -CS    Post-Treatment Pain Level 0  -CS       Posture/Observations    Alignment Options Forward head;Rounded shoulders  -CS    Forward Head Mild  -CS    Rounded Shoulders Mild  -CS    Posture/Observations Comments Pt has mild forward head and rounded shoulders at rest but aware of these symptoms and able to correct  -CS       Quarter Clearing    Quarter Clearing Upper Quarter Clearing  -CS       Neural Tension Signs- Upper Quarter Clearing    ULNTT 1 Left:;Negative  -CS    ULNTT 2 Left:;Postive  -CS    ULNTT 3 Left:;Postive  -CS    ULNTT 4 Left:;Negative  -CS       Sensory Screen for Light Touch- Upper Quarter Clearing    C4 (posterior shoulder) Bilateral:;Intact  -CS    C5 (lateral upper arm) Bilateral:;Intact  -CS    C6 (tip of thumb) Bilateral:;Intact  -CS    C7 (tip of 3rd finger)  Bilateral:;Intact  -CS    C8 (tip of 5th finger) Bilateral:;Intact  -CS    T1 (medial lower arm) Bilateral:;Intact  -CS       Myotomal Screen- Upper Quarter Clearing    Shoulder flexion (C5) 5 (Normal)  -CS    Elbow flexion/wrist extension (C6) 5 (Normal)  -CS    Elbow extension/wrist flexion (C7) 5 (Normal)  -CS    Finger flexion/ (C8) 5 (Normal)  -CS    Finger abduction (T1) 5 (Normal)  -CS     5 (Normal)  -CS       Cervical/Shoulder ROM Screen    Cervical flexion Normal  -CS    Cervical extension Normal  Provoking symptoms  -CS    Cervical lateral flexion Normal  -CS    Cervical rotation Impaired  muscle tightness  -CS    Cervical quadrant (Spurling's) Normal  -CS    Shoulder elevation  Normal  -CS       Cervical Accessory Motions    Cervical Accessory Motions Tested? Yes  -CS    PA glide- C2 Left:;WNL  -CS    PA glide- C3 Left:;Hypomobile;Left pain  -CS    PA glide- C4 Left:;Hypomobile;Left pain  -CS    PA glide- C5 Left:;Hypomobile  -CS    PA glide- C6 Left:;Hypomobile;Left pain  -CS    PA glide- C7 Left:;Hypomobile;Left pain  -CS       Thoracic Accessory Motions    PA glide- T1 Center:;Hypomobile  -CS    PA glide- T2 Center:;Hypomobile  -CS    PA glide- T3 Center:;Hypomobile  -CS    PA glide- T4 Center:;Hypomobile  -CS    PA glide- T5 WNL  -CS    PA glide- T6 WNL  -CS    PA glide- T7 WNL  -CS       Cervical/Thoracic Special Tests    Spurlings (Foraminal Compression) Bilateral:;Negative  -CS    Cervical Compression (Forarminal Compression vs. Facet Pain) Left:;Positive;Right:;Negative  -CS    Cervical Distraction (Foraminal Compression vs. Facet Pain) Bilateral:;Negative  -CS       General ROM    GENERAL ROM COMMENTS All UE ROM WNL, no pain  -CS       Head/Neck/Trunk    Neck Extension AROM 54  Symptoms into L elbow and 2n,.d and 3rd digits  -CS    Neck Flexion AROM 60  -CS    Neck Lt Lateral Flexion AROM 24  Compensated w/ L rotation  -CS    Neck Rt Lateral Flexion AROM 30  -CS    Neck Lt Rotation AROM  40 degrees  -CS    Neck Rt Rotation AROM 40 degrees  -CS       MMT Right Upper Ext    Rt Shoulder Flexion MMT, Gross Movement (5/5) normal  -CS    Rt Shoulder Internal Rotation MMT, Gross Movement (5/5) normal  -CS    Rt Shoulder External Rotation MMT, Gross Movement (5/5) normal  -CS    Rt Elbow Flexion MMT, Gross Movement: (5/5) normal  -CS    Rt Elbow Extension MMT, Gross Movement: (5/5) normal  -CS    Rt Wrist Flexion MMT, Gross Movement (5/5) normal  -CS    Rt Wrist Extension MMT, Gross Movement (5/5) normal  -CS       MMT Left Upper Ext    Lt Shoulder Flexion MMT, Gross Movement (5/5) normal  -CS    Lt Shoulder Internal Rotation MMT, Gross Movement (5/5) normal  -CS    Lt Shoulder External Rotation MMT, Gross Movement (5/5) normal  -CS    Lt Elbow Flexion MMT, Gross Movement (5/5) normal  -CS    Lt Elbow Extension MMT, Gross Movement (5/5) normal  -CS    Lt Wrist Flexion MMT, Gross Movement (5/5) normal  -CS    Lt Wrist Extension MMT, Gross Movement (5/5) normal  -CS        Strength Right    # Reps 3  -CS    Right Rung 2  -CS    Right  Test 1 97  -CS    Right  Test 2 90  -CS    Right  Test 3 88  -CS     Strength Average Right 91.67  -CS        Strength Left    # Reps 3  -CS    Left Rung 2  -CS    Left  Test 1 70  -CS    Left  Test 2 65  -CS    Left  Test 3 64  -CS     Strength Average Left 66.33  -CS       Sensation    Sensation WNL? WNL  -CS    Light Touch No apparent deficits  -CS              User Key  (r) = Recorded By, (t) = Taken By, (c) = Cosigned By      Initials Name Provider Type    CS Leon Mcgregor PT Physical Therapist                                Therapy Education  Education Details: POC, goals, activity modification, anatomy and physiology for cervical spine, nerve roots and peripheral pain  Given: Symptoms/condition management, Pain management, Posture/body mechanics  Program: New  How Provided: Verbal, Demonstration  Provided to: Patient  Level of  Understanding: Teach back education performed, Verbalized, Demonstrated      PT OP Goals       Row Name 10/25/24 0700          PT Short Term Goals    STG Date to Achieve 11/24/24  -CS     STG 1 Pt will be independent with initial HEP and postural awareness to improve pain and symptoms.  -CS     STG 1 Progress New  -CS     STG 2 Pt will improve cervical ext, rotation, and lateral flexion AROM w/out elliciting peripheral symptoms to improve ability to drive safely.  -CS     STG 2 Progress New  -CS        Long Term Goals    LTG Date to Achieve 12/24/24  -CS     LTG 1 Pt will be independent with advance HEP and postural awareness for continued management of pain and symptoms.  -CS     LTG 1 Progress New  -CS     LTG 2 Pt will return to prior level of activites such as hiking and weight lifting w/out an increase in peripheral symptoms to improve his functional independence and QOL.  -CS     LTG 2 Progress New  -CS     LTG 3 Pt will improve L cervical rotation AROM from 40 degrees to >55 degrees to improve ability to drive safely and no peripheral symptoms.  -CS     LTG 3 Progress New  -CS     LTG 4 Pt will decrease peripheral symptoms occurence during work related activities < 10% of the time to improve pt's QOL and perceived discomfort  -CS     LTG 4 Progress New  -CS     LTG 5 --  -CS     LTG 5 Progress --  -CS        Time Calculation    PT Goal Re-Cert Due Date 01/23/25  -CS               User Key  (r) = Recorded By, (t) = Taken By, (c) = Cosigned By      Initials Name Provider Type    CS Leon Mcgregor, PT Physical Therapist                     PT Assessment/Plan       Row Name 10/25/24 0800          PT Assessment    Functional Limitations Performance in leisure activities;Performance in sport activities;Performance in work activities  -CS     Impairments Joint mobility;Peripheral nerve integrity;Poor body mechanics  -CS     Assessment Comments Colby Santillan is a 68 y.o. male referred to physical therapy for C7  radiculopathy. He presents with a stable clinical presentation, along with comorbidities of hx of CA and personal factors of chronicity of pain that may impact his progress in the plan of care. Pt presents today with neck pain and L sided radiating symptoms that began in August of this year. His radiating symptoms extend into his 2nd and 3rd digit of his hand, typically related to specific positioning related to forward head and rounded shoulders. 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 Pt reports he has not been able to exercise at his previous level due to these peripheral symptoms and is concerned he may have another flare-up if he initiates too soon. His signs and symptoms are consistent with referring diagnosis. Pt was recently seen at another PT facility for this diagnosis, he quit going because he would always have increased symptoms following his sessions, advise pt to be vocal and report to any PT he works with at  to let us know if any of the exercises are irritating or similar to previous exercises he performed. The previous impairments limit his ability to work for extended periods of time and exercise at his prior level. Patients NDI outcome measure, scored a 1/50 (50 indicates highest level of disability and 0 describes no disability and high levels of fucntion), pt's symptoms are significantly improving but still remains limited with reading or using a computer. Pt will benefit from skilled PT to address the previous impairments and return to PLOF.  -CS     Please refer to paper survey for additional self-reported information Yes  -CS     Rehab Potential Good  -CS     Patient/caregiver participated in establishment of treatment plan and goals Yes  -CS     Patient would benefit from skilled therapy intervention Yes  -CS        PT Plan    PT Frequency 1x/week  -CS     Predicted Duration of Therapy  Intervention (PT) 6-8 visits  -CS     Planned CPT's? PT EVAL LOW COMPLEXITY: 74348;PT RE-EVAL: 68552;PT THER PROC EA 15 MIN: 98671;PT THER ACT EA 15 MIN: 23782;PT MANUAL THERAPY EA 15 MIN: 71628;PT NEUROMUSC RE-EDUCATION EA 15 MIN: 06861;PT EVAL AQUA: 61687;PT AQUATIC THERAPY EA 15 MIN: 61678;PT SELF CARE/HOME MGMT/TRAIN EA 15: 38694;PT HOT OR COLD PACK TREAT [facility]ARE;PT ELECTRICAL STIM UNATTEND: ;PT ELECTRICAL STIM ATTD EA 15 MIN: 77273;PT ULTRASOUND EA 15 MIN: 70438;PT TRACTION CERVICAL: 67652;PT HOT/COLD PACK WC NONMCARE: 92760;PT SELF CARE/MGMT/TRAIN 15 MIN: 28026  -CS     PT Plan Comments GANGA MORENO mobs to C and T spine chin tucks and chin tucks w/ rotation (may be able to perform in standing), SL thoracic rotation, pec stretch at doorway, median nerve glides, initiate HEP  -CS               User Key  (r) = Recorded By, (t) = Taken By, (c) = Cosigned By      Initials Name Provider Type    Leon Rodriguez PT Physical Therapist                       OP Exercises       Row Name 10/25/24 0700             Subjective    Subjective Comments Eval  -CS         Subjective Pain    Able to rate subjective pain? yes  -CS      Pre-Treatment Pain Level 0  -CS      Post-Treatment Pain Level 0  -CS         Exercise 1    Exercise Name 1 UBE  -CS      Additional Comments Next session  -CS         Exercise 2    Exercise Name 2 Chin tucks  -CS      Additional Comments next session  -CS         Exercise 3    Exercise Name 3 Chin tucks w/ rotation  -CS      Additional Comments next session  -CS         Exercise 4    Exercise Name 4 Pec stretch at doorway  -CS      Additional Comments next session  -CS                User Key  (r) = Recorded By, (t) = Taken By, (c) = Cosigned By      Initials Name Provider Type    Leon Rodriguez PT Physical Therapist                                  Outcome Measure Options: Neck Disability Index (NDI)  Neck Disability Index  Section 1 - Pain Intensity: I have no pain at the moment.  Section 2 -  Personal Care: I can look after myself normally without causing extra pain.  Section 3 - Lifting: I can lift heavy weights without extra pain  Section 4 - Reading: I can read as much as I want with slight neck pain.  Section 5 - Headaches: I have no headaches at all.  Section 6 - Concentration: I can concentrate fully when I want to without difficulty.  Section 7 - Work: I can do as much work as I want.  Section 8 - Driving: I can drive my car without neck pain  Section 9 - Sleeping: I have no trouble sleeping.  Section 10 - Recreation: I am able to engage in all recreational activities with no pain in my neck at all.  Neck Disability Index Score: 1      Time Calculation:     Start Time: 0703  Stop Time: 0744  Time Calculation (min): 41 min  Untimed Charges  PT Eval/Re-eval Minutes: 41  Total Minutes  Untimed Charges Total Minutes: 41   Total Minutes: 41     Therapy Charges for Today       Code Description Service Date Service Provider Modifiers Qty    01083012752 HC PT EVAL LOW COMPLEXITY 3 10/25/2024 Leon Mcgregor, PT GP 1            PT G-Codes  Outcome Measure Options: Neck Disability Index (NDI)  Neck Disability Index Score: 1         Leon Mcgregor PT  10/25/2024

## 2024-11-01 ENCOUNTER — PATIENT MESSAGE (OUTPATIENT)
Dept: INTERNAL MEDICINE | Facility: CLINIC | Age: 69
End: 2024-11-01
Payer: MEDICARE

## 2024-11-01 ENCOUNTER — HOSPITAL ENCOUNTER (OUTPATIENT)
Dept: PHYSICAL THERAPY | Facility: HOSPITAL | Age: 69
Setting detail: THERAPIES SERIES
Discharge: HOME | End: 2024-11-01
Payer: MEDICARE

## 2024-11-01 DIAGNOSIS — N40.0 BENIGN PROSTATIC HYPERPLASIA, UNSPECIFIED WHETHER LOWER URINARY TRACT SYMPTOMS PRESENT: Primary | ICD-10-CM

## 2024-11-01 DIAGNOSIS — M54.2 NECK PAIN ON LEFT SIDE: Primary | ICD-10-CM

## 2024-11-01 DIAGNOSIS — M79.2 RADICULAR PAIN IN LEFT ARM: ICD-10-CM

## 2024-11-01 PROCEDURE — 97140 MANUAL THERAPY 1/> REGIONS: CPT

## 2024-11-01 PROCEDURE — 97110 THERAPEUTIC EXERCISES: CPT

## 2024-11-01 NOTE — THERAPY TREATMENT NOTE
"  Outpatient Physical Therapy Ortho Treatment Note  [facility]     Patient Name: Colby Santillan  : 1955  MRN: [id number]  Today's Date: 2024      Visit Date: 2024    Visit Dx:    ICD-10-CM ICD-9-CM   1. Neck pain on left side  M54.2 723.1   2. Radicular pain in left arm  M79.2 729.2       Patient Active Problem List   Diagnosis    Conductive hearing loss of both ears    Folliculitis    History of colon cancer, stage III    Benign essential tremor    History of skin cancer    Dystonic tremor    B12 deficiency        Past Medical History:   Diagnosis Date    Anesthesia complication     PATIENT REQUEST \"MINIMAL SEDATION\" NOT FOR ANY PROBLEMS JUST PREFERENCE    Benign prostatic hyperplasia 2019    Cancer 2004    colon cancer    Colon cancer Diagnosis in     See above    Colon polyp 2004    Constipation     WAS IN THE ER PREVIOUSLY FOR THIS..HAD CT SCAN AT THIS TIME    Hernia 23    Noted in c-scan    History of chemotherapy     STATES ONE TREATMENT FOR COLON CANCER. UNABLE TO TOLERATE    HL (hearing loss) 2000    WITH YRIS HEARING AIDS    Lactose intolerance     Eliminated dairy from my diet    Rectal bleeding     Immediately had a colonoscopy, which came back negative (erroneously).  Had second colonoscopy in  which confirmed colon cancer.    Skin cancer     REMOVED FROM FACE AND CHEST    Tremor 2010        Past Surgical History:   Procedure Laterality Date    ABDOMINAL SURGERY      resection for colon cancer    APPENDECTOMY  2004    Doctor decided to remove during cancer surgery (colon resection)    COLON SURGERY  2004    Colon resection for cancer    COLONOSCOPY  Latest:     COLONOSCOPY N/A 2023    Procedure: COLONOSCOPY into cecum;  Surgeon: Jack Padilla MD;  Location: [facility] ENDOSCOPY;  Service: Gastroenterology;  Laterality: N/A;    FRACTURE SURGERY Left 1985    HARDWARE SINCE REMOVED "    VASECTOMY  10/1/1994                        PT Assessment/Plan       Row Name 11/01/24 1400          PT Assessment    Assessment Comments Colby returns to PT for his first treatment session, reporting no changes since his evaluation. Pt provided with an HEP to address scapular stability and strengthening as well as anterior chest wall stretching. Began session with a warm-up at UBE and moved to manual techniques for cervical musculature, pt provided with a median and radial nerve glide this session but reported no symptoms or stretch, decided to discontinue this exercises. Main symptoms were felt w/ UPA to L-side cervical spine at C5 and C7 transverse processes. Provided pt w/ sleep positioning education as his symptoms increase at times while L shoulder is adducted while lying on R-side. Pt showing great postural control and awareness during exercises, able to progress his exercises to standing as supine did not provide much of a chllenge. Pt likely will progress quickly, continue to educate re: recovery process of peripheral nerves.  -CS        PT Plan    PT Plan Comments Continue w/ manual traction and UPAs; assess if pt began return to running as well as HEP performance; D2 flexion while standing, theraloop flexion  -CS               User Key  (r) = Recorded By, (t) = Taken By, (c) = Cosigned By      Initials Name Provider Type    CS Leon Mcgregor, PT Physical Therapist                       OP Exercises       Row Name 11/01/24 1500 11/01/24 1400 11/01/24 1300       Subjective    Subjective Comments -- My symptoms are much of the same as last week  -CS --       Subjective Pain    Able to rate subjective pain? -- yes  -CS --    Pre-Treatment Pain Level -- 0  -CS --    Post-Treatment Pain Level -- 0  -CS --       Total Minutes    62228 - PT Therapeutic Exercise Minutes --  -CS 25  -CS --    48943 - PT Manual Therapy Minutes -- 14  -CS --  -CS       Exercise 1    Exercise Name 1 -- UBE  -CS --    Cueing 1 --  Verbal  -CS --    Time 1 -- 4 min  -CS --       Exercise 2    Exercise Name 2 -- SL thoracic rotation  -CS --    Cueing 2 -- Verbal  -CS --    Reps 2 -- 10ea  -CS --    Time 2 -- 5s  -CS --       Exercise 3    Exercise Name 3 -- Chin tucks w/ rotation  -CS --    Cueing 3 -- Verbal;Demo  -CS --    Sets 3 -- 2  -CS --    Reps 3 -- 10  -CS --    Time 3 -- 5s hold  -CS --       Exercise 4    Exercise Name 4 -- Pec stretch at doorway  -CS --    Cueing 4 -- Verbal  -CS --    Sets 4 -- 3  -CS --    Time 4 -- 20s  -CS --       Exercise 5    Exercise Name 5 -- Standing HA  -CS --    Cueing 5 -- Verbal  -CS --    Sets 5 -- 2  -CS --    Reps 5 -- 10  -CS --    Additional Comments -- GTB, foam noodle at back  -CS --       Exercise 6    Exercise Name 6 -- Shoulder Ext  -CS --    Cueing 6 -- Verbal  -CS --    Sets 6 -- 2  -CS --    Reps 6 -- 12  -CS --    Additional Comments -- GTB  -CS --       Exercise 7    Exercise Name 7 -- Sleep education  -CS --              User Key  (r) = Recorded By, (t) = Taken By, (c) = Cosigned By      Initials Name Provider Type    CS Leon Mcgregor, PT Physical Therapist                             Manual Rx (Last 36 Hours)       Manual Treatments       Row Name 11/01/24 1400 11/01/24 1300          Total Minutes    73760 - PT Manual Therapy Minutes 14  -CS --  -CS        Manual Rx 1    Manual Rx 1 Location Cervical spine  -CS --  -CS     Manual Rx 1 Type STM to sub occipitals and UT  -CS --  -CS        Manual Rx 2    Manual Rx 2 Location Cervical traction  -CS --  -CS     Manual Rx 2 Type Specific at C5,C6 and C7  -CS --  -CS     Manual Rx 2 Duration 30s-60s  -CS --  -CS        Manual Rx 3    Manual Rx 3 Location UPAs in supine  -CS --  -CS     Manual Rx 3 Type L C-spine  -CS --  -CS     Manual Rx 3 Grade Grades III-IV  -CS --  -CS               User Key  (r) = Recorded By, (t) = Taken By, (c) = Cosigned By      Initials Name Provider Type    Leon Rodriguez, PT Physical Therapist                      PT OP Goals       Row Name 11/01/24 1400          PT Short Term Goals    STG Date to Achieve 11/24/24  -CS     STG 1 Pt will be independent with initial HEP and postural awareness to improve pain and symptoms.  -CS     STG 1 Progress Ongoing  -CS     STG 2 Pt will improve cervical ext, rotation, and lateral flexion AROM w/out elliciting peripheral symptoms to improve ability to drive safely.  -CS     STG 2 Progress Ongoing  -CS        Long Term Goals    LTG Date to Achieve 12/24/24  -CS     LTG 1 Pt will be independent with advance HEP and postural awareness for continued management of pain and symptoms.  -CS     LTG 1 Progress Ongoing  -CS     LTG 2 Pt will return to prior level of activites such as hiking and weight lifting w/out an increase in peripheral symptoms to improve his functional independence and QOL.  -CS     LTG 2 Progress Ongoing  -CS     LTG 3 Pt will improve L cervical rotation AROM from 40 degrees to >55 degrees to improve ability to drive safely and no peripheral symptoms.  -CS     LTG 3 Progress Ongoing  -CS     LTG 4 Pt will decrease peripheral symptoms occurence during work related activities < 10% of the time to improve pt's QOL and perceived discomfort  -CS     LTG 4 Progress Ongoing  -CS               User Key  (r) = Recorded By, (t) = Taken By, (c) = Cosigned By      Initials Name Provider Type    Leon Rodriguez PT Physical Therapist                    Therapy Education  Education Details: HEP, sleep positioning education, anatomy  Given: Symptoms/condition management, Pain management, Posture/body mechanics  Program: New  How Provided: Verbal, Demonstration  Provided to: Patient  Level of Understanding: Teach back education performed, Verbalized, Demonstrated  72215 - PT Self Care/Mgmt Minutes: 4              Time Calculation:   Start Time: 1402  Stop Time: 1445  Time Calculation (min): 43 min  Timed Charges  26991 - PT Therapeutic Exercise Minutes: 25  68402 - PT Manual Therapy Minutes:  14  60797 - PT Self Care/Mgmt Minutes: 4  Total Minutes  Timed Charges Total Minutes: 43   Total Minutes: 43  Therapy Charges for Today       Code Description Service Date Service Provider Modifiers Qty    25261095050  PT THER PROC EA 15 MIN 11/1/2024 Leno Mcgregor, PT GP 2    [id number]  PT MANUAL THERAPY EA 15 MIN 11/1/2024 Leon Mcgregor, PT GP 1                      Leon Mcgregor PT  11/1/2024

## 2024-11-13 ENCOUNTER — HOSPITAL ENCOUNTER (OUTPATIENT)
Dept: PHYSICAL THERAPY | Facility: HOSPITAL | Age: 69
Setting detail: THERAPIES SERIES
Discharge: HOME | End: 2024-11-13
Payer: MEDICARE

## 2024-11-13 DIAGNOSIS — M79.2 RADICULAR PAIN IN LEFT ARM: ICD-10-CM

## 2024-11-13 DIAGNOSIS — M54.2 NECK PAIN ON LEFT SIDE: Primary | ICD-10-CM

## 2024-11-13 PROCEDURE — 97140 MANUAL THERAPY 1/> REGIONS: CPT

## 2024-11-13 PROCEDURE — 97110 THERAPEUTIC EXERCISES: CPT

## 2024-11-13 NOTE — THERAPY TREATMENT NOTE
"  Outpatient Physical Therapy Ortho Treatment Note  [facility]     Patient Name: Colby Santillan  : 1955  MRN: [id number]  Today's Date: 2024      Visit Date: 2024    Visit Dx:    ICD-10-CM ICD-9-CM   1. Neck pain on left side  M54.2 723.1   2. Radicular pain in left arm  M79.2 729.2       Patient Active Problem List   Diagnosis    Conductive hearing loss of both ears    Folliculitis    History of colon cancer, stage III    Benign essential tremor    History of skin cancer    Dystonic tremor    B12 deficiency        Past Medical History:   Diagnosis Date    Anesthesia complication     PATIENT REQUEST \"MINIMAL SEDATION\" NOT FOR ANY PROBLEMS JUST PREFERENCE    Benign prostatic hyperplasia 2019    Cancer 2004    colon cancer    Colon cancer Diagnosis in     See above    Colon polyp 2004    Constipation     WAS IN THE ER PREVIOUSLY FOR THIS..HAD CT SCAN AT THIS TIME    Hernia 23    Noted in c-scan    History of chemotherapy     STATES ONE TREATMENT FOR COLON CANCER. UNABLE TO TOLERATE    HL (hearing loss) 2000    WITH YRIS HEARING AIDS    Lactose intolerance     Eliminated dairy from my diet    Rectal bleeding     Immediately had a colonoscopy, which came back negative (erroneously).  Had second colonoscopy in  which confirmed colon cancer.    Skin cancer     REMOVED FROM FACE AND CHEST    Tremor 2010        Past Surgical History:   Procedure Laterality Date    ABDOMINAL SURGERY      resection for colon cancer    APPENDECTOMY  2004    Doctor decided to remove during cancer surgery (colon resection)    COLON SURGERY  2004    Colon resection for cancer    COLONOSCOPY  Latest:     COLONOSCOPY N/A 2023    Procedure: COLONOSCOPY into cecum;  Surgeon: Jack Padilla MD;  Location: [facility] ENDOSCOPY;  Service: Gastroenterology;  Laterality: N/A;    FRACTURE SURGERY Left 1985    HARDWARE SINCE " REMOVED    VASECTOMY  10/1/1994                        PT Assessment/Plan       Row Name 11/13/24 0900          PT Assessment    Assessment Comments Colby returns to PT reporting no increase in symptoms but minimal improvements with specific positioning such as shaving (combined protraction and extension) and R SL w/ L shoulder adduction. Continued to educate pt on anatomy of cervical spine and relation to shoulder positioning. Began session with emphasis on manual work to improve cervical spine mobility at C5-C7 as most his symptoms originate from this area. Continued w/ STM at UT, suboccipitals, and LS as pt noted with increased tender points and muscular tightness. Provided pt with stretches after manual therapy to address these assessments and updated his HEP. Pt able to progress stengthening exercises this session with emphasis on posutral re-education and deep cervical flexor re-education for improved endurance. Pt is progressing well and mainly limited w/ provocative positions, will continue to progress postural strengthening, pt remains a good candidate for skilled PT.  -CS        PT Plan    PT Plan Comments Increase resistance, SL open books, traction?  -CS               User Key  (r) = Recorded By, (t) = Taken By, (c) = Cosigned By      Initials Name Provider Type    CS Leon Mcgregor, PT Physical Therapist                       OP Exercises       Row Name 11/13/24 0900             Subjective    Subjective Comments It has gotten worse, but it still bothers me w/ certain movements  -CS         Subjective Pain    Able to rate subjective pain? yes  -CS      Pre-Treatment Pain Level 0  -CS      Post-Treatment Pain Level 0  -CS         Total Minutes    22715 - PT Therapeutic Exercise Minutes 33  -CS      12950 - PT Manual Therapy Minutes 12  -CS         Exercise 1    Exercise Name 1 UBE  -CS      Cueing 1 Verbal  -CS      Time 1 4 min  -CS         Exercise 3    Exercise Name 3 Chin tucks w/ rotation  -CS       Cueing 3 Verbal;Demo  -CS      Sets 3 2  -CS      Reps 3 10  -CS      Time 3 5s hold  -CS      Additional Comments Seated w/ green ball behind head  -CS         Exercise 4    Exercise Name 4 Pec stretch at doorway  -CS      Cueing 4 Verbal  -CS      Sets 4 3  -CS      Time 4 20s  -CS         Exercise 5    Exercise Name 5 Standing HA  -CS      Cueing 5 Verbal  -CS      Sets 5 2  -CS      Reps 5 10  -CS      Additional Comments GT, foam noodle at back  -CS         Exercise 6    Exercise Name 6 Shoulder Ext  -CS      Cueing 6 Verbal  -CS      Sets 6 2  -CS      Reps 6 12  -CS      Additional Comments GTB  -CS         Exercise 7    Exercise Name 7 UT stretch  -CS      Cueing 7 Verbal;Demo  -CS      Sets 7 3  -CS      Time 7 20s  -CS         Exercise 8    Exercise Name 8 Deep cervical flexors endurance  -CS      Cueing 8 Verbal  -CS      Reps 8 4  -CS      Time 8 10, 10, 20, 30s  -CS      Additional Comments Chin tuck + head lift from table  -CS         Exercise 9    Exercise Name 9 D2 flexion  -CS      Cueing 9 Verbal;Demo  -CS      Sets 9 2  -CS      Reps 9 10  -CS      Additional Comments GTB, foam noodle at back  -CS         Exercise 10    Exercise Name 10 Theraloop Flex  -CS      Cueing 10 Verbal;Demo  -CS      Sets 10 2  -CS      Reps 10 10  -CS      Additional Comments GTB  -CS                User Key  (r) = Recorded By, (t) = Taken By, (c) = Cosigned By      Initials Name Provider Type    CS Leon Mcgregor, PT Physical Therapist                             Manual Rx (Last 36 Hours)       Manual Treatments       Row Name 11/13/24 0900             Total Minutes    18775 - PT Manual Therapy Minutes 12  -CS         Manual Rx 1    Manual Rx 1 Location Cervical spine  -CS      Manual Rx 1 Type STM to sub occipitals, UT, and LS  -CS         Manual Rx 2    Manual Rx 2 Location Cervical traction  -CS      Manual Rx 2 Type Specific at C5,C6 and C7  -CS         Manual Rx 3    Manual Rx 3 Location UPAs in prone  -CS       Manual Rx 3 Type C5-C7  -CS      Manual Rx 3 Grade Grades III-IV  -CS                User Key  (r) = Recorded By, (t) = Taken By, (c) = Cosigned By      Initials Name Provider Type    Leon Rodriguez, PT Physical Therapist                     PT OP Goals       Row Name 11/13/24 0900          PT Short Term Goals    STG Date to Achieve 11/24/24  -CS     STG 1 Pt will be independent with initial HEP and postural awareness to improve pain and symptoms.  -CS     STG 1 Progress Met  -CS     STG 1 Progress Comments Pt reports good adherence to HEP since Eval date  -CS     STG 2 Pt will improve cervical ext, rotation, and lateral flexion AROM w/out elliciting peripheral symptoms to improve ability to drive safely.  -CS     STG 2 Progress Ongoing  -CS        Long Term Goals    LTG Date to Achieve 12/24/24  -CS     LTG 1 Pt will be independent with advance HEP and postural awareness for continued management of pain and symptoms.  -CS     LTG 1 Progress Ongoing  -CS     LTG 2 Pt will return to prior level of activites such as hiking and weight lifting w/out an increase in peripheral symptoms to improve his functional independence and QOL.  -CS     LTG 2 Progress Ongoing  -CS     LTG 3 Pt will improve L cervical rotation AROM from 40 degrees to >55 degrees to improve ability to drive safely and no peripheral symptoms.  -CS     LTG 3 Progress Ongoing  -CS     LTG 4 Pt will decrease peripheral symptoms occurence during work related activities < 10% of the time to improve pt's QOL and perceived discomfort  -CS     LTG 4 Progress Ongoing  -CS               User Key  (r) = Recorded By, (t) = Taken By, (c) = Cosigned By      Initials Name Provider Type    Leon Rodriguez PT Physical Therapist                    Therapy Education  Education Details: HEP update, sleep positioning, streching  Given: Symptoms/condition management, Pain management, Posture/body mechanics  Program: New, Reinforced  How Provided: Verbal,  Demonstration  Provided to: Patient  Level of Understanding: Teach back education performed, Verbalized, Demonstrated              Time Calculation:   Start Time: 0911  Stop Time: 0956  Time Calculation (min): 45 min  Timed Charges  97628 - PT Therapeutic Exercise Minutes: 33  73253 - PT Manual Therapy Minutes: 12  Total Minutes  Timed Charges Total Minutes: 45   Total Minutes: 45  Therapy Charges for Today       Code Description Service Date Service Provider Modifiers Qty    [id number]  PT THER PROC EA 15 MIN 11/13/2024 Leon Mcgregor, PT GP 2    [id number]  PT MANUAL THERAPY EA 15 MIN 11/13/2024 Leon Mcgregor, PT GP 1                      Leon Mcgregor, PT  11/13/2024

## 2024-11-20 ENCOUNTER — APPOINTMENT (OUTPATIENT)
Dept: PHYSICAL THERAPY | Facility: HOSPITAL | Age: 69
End: 2024-11-20
Payer: MEDICARE

## 2024-11-27 ENCOUNTER — APPOINTMENT (OUTPATIENT)
Dept: PHYSICAL THERAPY | Facility: HOSPITAL | Age: 69
End: 2024-11-27
Payer: MEDICARE

## 2025-01-31 ENCOUNTER — PATIENT MESSAGE (OUTPATIENT)
Dept: INTERNAL MEDICINE | Facility: CLINIC | Age: 70
End: 2025-01-31
Payer: MEDICARE

## 2025-01-31 DIAGNOSIS — N40.0 BENIGN PROSTATIC HYPERPLASIA, UNSPECIFIED WHETHER LOWER URINARY TRACT SYMPTOMS PRESENT: Primary | ICD-10-CM

## 2025-02-17 ENCOUNTER — PATIENT MESSAGE (OUTPATIENT)
Dept: INTERNAL MEDICINE | Facility: CLINIC | Age: 70
End: 2025-02-17
Payer: MEDICARE

## 2025-04-26 ENCOUNTER — HOSPITAL ENCOUNTER (EMERGENCY)
Facility: HOSPITAL | Age: 70
Discharge: HOME | End: 2025-04-26
Attending: EMERGENCY MEDICINE
Payer: MEDICARE

## 2025-04-26 VITALS
TEMPERATURE: 98.9 F | SYSTOLIC BLOOD PRESSURE: 118 MMHG | HEART RATE: 66 BPM | RESPIRATION RATE: 16 BRPM | OXYGEN SATURATION: 96 % | DIASTOLIC BLOOD PRESSURE: 74 MMHG

## 2025-04-26 DIAGNOSIS — R33.9 URINARY RETENTION: Primary | ICD-10-CM

## 2025-04-26 LAB
ALBUMIN SERPL-MCNC: 4 G/DL (ref 3.5–5.2)
ALBUMIN/GLOB SERPL: 1.5 G/DL
ALP SERPL-CCNC: 122 U/L (ref 39–117)
ALT SERPL W P-5'-P-CCNC: 12 U/L (ref 1–41)
ANION GAP SERPL CALCULATED.3IONS-SCNC: 7.6 MMOL/L (ref 5–15)
AST SERPL-CCNC: 24 U/L (ref 1–40)
BACTERIA UR QL AUTO: ABNORMAL /HPF
BASOPHILS # BLD AUTO: 0.02 10*3/MM3 (ref 0–0.2)
BASOPHILS NFR BLD AUTO: 0.3 % (ref 0–1.5)
BILIRUB SERPL-MCNC: 0.4 MG/DL (ref 0–1.2)
BILIRUB UR QL STRIP: NEGATIVE
BUN SERPL-MCNC: 11 MG/DL (ref 8–23)
BUN/CREAT SERPL: 13.3 (ref 7–25)
CALCIUM SPEC-SCNC: 9.3 MG/DL (ref 8.6–10.5)
CHLORIDE SERPL-SCNC: 107 MMOL/L (ref 98–107)
CLARITY UR: CLEAR
CO2 SERPL-SCNC: 22.4 MMOL/L (ref 22–29)
COLOR UR: YELLOW
CREAT SERPL-MCNC: 0.83 MG/DL (ref 0.76–1.27)
DEPRECATED RDW RBC AUTO: 44.5 FL (ref 37–54)
EGFRCR SERPLBLD CKD-EPI 2021: 95.3 ML/MIN/1.73
EOSINOPHIL # BLD AUTO: 0.07 10*3/MM3 (ref 0–0.4)
EOSINOPHIL NFR BLD AUTO: 1.1 % (ref 0.3–6.2)
ERYTHROCYTE [DISTWIDTH] IN BLOOD BY AUTOMATED COUNT: 12.7 % (ref 12.3–15.4)
GLOBULIN UR ELPH-MCNC: 2.7 GM/DL
GLUCOSE SERPL-MCNC: 99 MG/DL (ref 65–99)
GLUCOSE UR STRIP-MCNC: NEGATIVE MG/DL
HCT VFR BLD AUTO: 43.5 % (ref 37.5–51)
HGB BLD-MCNC: 14.3 G/DL (ref 13–17.7)
HGB UR QL STRIP.AUTO: ABNORMAL
HYALINE CASTS UR QL AUTO: ABNORMAL /LPF
IMM GRANULOCYTES # BLD AUTO: 0.01 10*3/MM3 (ref 0–0.05)
IMM GRANULOCYTES NFR BLD AUTO: 0.2 % (ref 0–0.5)
KETONES UR QL STRIP: NEGATIVE
LEUKOCYTE ESTERASE UR QL STRIP.AUTO: NEGATIVE
LYMPHOCYTES # BLD AUTO: 0.75 10*3/MM3 (ref 0.7–3.1)
LYMPHOCYTES NFR BLD AUTO: 11.6 % (ref 19.6–45.3)
MCH RBC QN AUTO: 31 PG (ref 26.6–33)
MCHC RBC AUTO-ENTMCNC: 32.9 G/DL (ref 31.5–35.7)
MCV RBC AUTO: 94.2 FL (ref 79–97)
MONOCYTES # BLD AUTO: 0.45 10*3/MM3 (ref 0.1–0.9)
MONOCYTES NFR BLD AUTO: 6.9 % (ref 5–12)
NEUTROPHILS NFR BLD AUTO: 5.19 10*3/MM3 (ref 1.7–7)
NEUTROPHILS NFR BLD AUTO: 79.9 % (ref 42.7–76)
NITRITE UR QL STRIP: NEGATIVE
NRBC BLD AUTO-RTO: 0 /100 WBC (ref 0–0.2)
PH UR STRIP.AUTO: 6.5 [PH] (ref 5–8)
PLATELET # BLD AUTO: 196 10*3/MM3 (ref 140–450)
PMV BLD AUTO: 10.1 FL (ref 6–12)
POTASSIUM SERPL-SCNC: 4 MMOL/L (ref 3.5–5.2)
PROT SERPL-MCNC: 6.7 G/DL (ref 6–8.5)
PROT UR QL STRIP: NEGATIVE
RBC # BLD AUTO: 4.62 10*6/MM3 (ref 4.14–5.8)
RBC # UR STRIP: ABNORMAL /HPF
REF LAB TEST METHOD: ABNORMAL
SODIUM SERPL-SCNC: 137 MMOL/L (ref 136–145)
SP GR UR STRIP: 1.01 (ref 1–1.03)
SQUAMOUS #/AREA URNS HPF: ABNORMAL /HPF
UROBILINOGEN UR QL STRIP: ABNORMAL
WBC # UR STRIP: ABNORMAL /HPF
WBC NRBC COR # BLD AUTO: 6.49 10*3/MM3 (ref 3.4–10.8)

## 2025-04-26 PROCEDURE — 51702 INSERT TEMP BLADDER CATH: CPT

## 2025-04-26 PROCEDURE — 36415 COLL VENOUS BLD VENIPUNCTURE: CPT

## 2025-04-26 PROCEDURE — 85025 COMPLETE CBC W/AUTO DIFF WBC: CPT | Performed by: EMERGENCY MEDICINE

## 2025-04-26 PROCEDURE — 99283 EMERGENCY DEPT VISIT LOW MDM: CPT

## 2025-04-26 PROCEDURE — 80053 COMPREHEN METABOLIC PANEL: CPT | Performed by: EMERGENCY MEDICINE

## 2025-04-26 PROCEDURE — 81001 URINALYSIS AUTO W/SCOPE: CPT | Performed by: EMERGENCY MEDICINE

## 2025-04-26 RX ORDER — ALFUZOSIN HYDROCHLORIDE 10 MG/1
10 TABLET, EXTENDED RELEASE ORAL DAILY
Qty: 30 TABLET | Refills: 0 | Status: SHIPPED | OUTPATIENT
Start: 2025-04-26

## 2025-04-26 RX ORDER — LIDOCAINE HYDROCHLORIDE 20 MG/ML
JELLY TOPICAL ONCE
Status: COMPLETED | OUTPATIENT
Start: 2025-04-26 | End: 2025-04-26

## 2025-04-26 RX ADMIN — LIDOCAINE HYDROCHLORIDE: 20 JELLY TOPICAL at 15:02

## 2025-04-26 NOTE — ED PROVIDER NOTES
EMERGENCY DEPARTMENT ENCOUNTER  Room Number:  38/38  PCP: Cathleen Kebede APRN  Independent Historians: Patient  Date of encounter:  4/26/2025  Patient Care Team:  Cathleen Kebede APRN as PCP - General (Internal Medicine)  Prudence Davis MD (Psychiatry)  Luna Way MD as Consulting Physician (Dermatology)  Jack Padilla MD as Surgeon (General Surgery)     HPI:  Chief Complaint: had concerns including Urinary Retention.     A complete HPI/ROS/PMH/PSH/SH/FH are unobtainable due to: None    Chronic or social conditions impacting patient care (Social Determinants of Health): None    Context: Colby Santillan is a 69 y.o. male with a medical history of BPH who presents to the ED c/o acute difficulty urinating.  Patient has known BPH and is scheduled to see a urologist in 2 days.  He began having difficulty urinating this morning and has been unable to urinate for the past several hours.  He attempted to take a warm shower but this did not help his symptoms.  He endorses lower abdominal pain.  No fever, chills, nausea or vomiting.  No prior history of this.  Patient was previously on Flomax for BPH but did not tolerate it due to sinus congestion.    PAST MEDICAL HISTORY  Active Ambulatory Problems     Diagnosis Date Noted    Conductive hearing loss of both ears 04/13/2021    Folliculitis 04/13/2021    History of colon cancer, stage III 04/13/2021    Benign essential tremor 03/28/2022    History of skin cancer 01/09/2023    Dystonic tremor 03/31/2023    B12 deficiency 05/31/2023     Resolved Ambulatory Problems     Diagnosis Date Noted    Urinary frequency 03/28/2022    Change in bowel habit 03/31/2023     Past Medical History:   Diagnosis Date    Anesthesia complication     Benign prostatic hyperplasia 01/01/2019    Cancer 12/01/2004    Colon cancer Diagnosis in December, 2004    Colon polyp 12/01/2004    Constipation     Hernia 5/1/23    History of chemotherapy     HL (hearing loss) 07/01/2000    Lactose  intolerance 2000    Rectal bleeding June, 2003    Skin cancer     Tremor 7/1/2010       PAST SURGICAL HISTORY  Past Surgical History:   Procedure Laterality Date    ABDOMINAL SURGERY  2004    resection for colon cancer    APPENDECTOMY  12/21/2004    Doctor decided to remove during cancer surgery (colon resection)    COLON SURGERY  12/21/2004    Colon resection for cancer    COLONOSCOPY  Latest: 2016    COLONOSCOPY N/A 04/25/2023    Procedure: COLONOSCOPY into cecum;  Surgeon: Jack Padilla MD;  Location: [facility] ENDOSCOPY;  Service: Gastroenterology;  Laterality: N/A;    FRACTURE SURGERY Left 01/05/1985    HARDWARE SINCE REMOVED    VASECTOMY  10/1/1994       FAMILY HISTORY  Family History   Problem Relation Age of Onset    Ovarian cancer Mother     Cancer Mother         Ovarian    Miscarriages / Stillbirths Mother     Hypertension Father         Issue has been resolved through change of diet    Prostate cancer Father     Tremor Father     Alcohol abuse Father     Anxiety disorder Father     Cancer Father         Prostate    Hearing loss Father     Tremor Brother     Diabetes Brother     Alcohol abuse Brother     Cancer Brother         Skin    Colon polyps Brother     Hearing loss Maternal Grandfather     Mental illness Maternal Grandmother     Diabetes Brother     Diabetes Brother     Colon polyps Brother        SOCIAL HISTORY  Social History     Socioeconomic History    Marital status:    Tobacco Use    Smoking status: Never    Smokeless tobacco: Never   Vaping Use    Vaping status: Never Used   Substance and Sexual Activity    Alcohol use: Yes     Alcohol/week: 1.0 standard drink of alcohol     Types: 1 Cans of beer per week     Comment: Infrequent    Drug use: Never    Sexual activity: Yes     Partners: Female     Birth control/protection: Vasectomy     Comment: Vasectomy       ALLERGIES  Patient has no known allergies.    REVIEW OF SYSTEMS  Review of Systems  Included in HPI  All systems reviewed and  negative except for those discussed in HPI.    PHYSICAL EXAM    I have reviewed the triage vital signs and nursing notes.    ED Triage Vitals [04/26/25 1416]   Temp Heart Rate Resp BP SpO2   -- 80 16 -- 98 %      Temp src Heart Rate Source Patient Position BP Location FiO2 (%)   -- -- -- -- --       Physical Exam  Vitals and nursing note reviewed.   Constitutional:       General: He is not in acute distress.     Appearance: Normal appearance. He is not ill-appearing, toxic-appearing or diaphoretic.   HENT:      Head: Normocephalic and atraumatic.      Nose: Nose normal.      Mouth/Throat:      Mouth: Mucous membranes are moist.   Eyes:      Extraocular Movements: Extraocular movements intact.      Conjunctiva/sclera: Conjunctivae normal.      Pupils: Pupils are equal, round, and reactive to light.   Cardiovascular:      Rate and Rhythm: Normal rate and regular rhythm.      Pulses: Normal pulses.      Heart sounds: Normal heart sounds. No murmur heard.     No friction rub. No gallop.   Pulmonary:      Effort: Pulmonary effort is normal. No respiratory distress.      Breath sounds: Normal breath sounds. No stridor. No wheezing, rhonchi or rales.   Abdominal:      General: Abdomen is flat. There is no distension.      Palpations: Abdomen is soft.      Tenderness: There is abdominal tenderness (Suprapubic with palpable bladder). There is no guarding or rebound.   Musculoskeletal:      Cervical back: Normal range of motion and neck supple.   Skin:     General: Skin is warm and dry.      Capillary Refill: Capillary refill takes less than 2 seconds.   Neurological:      General: No focal deficit present.      Mental Status: He is alert and oriented to person, place, and time. Mental status is at baseline.   Psychiatric:         Mood and Affect: Mood normal.         Behavior: Behavior normal.         Thought Content: Thought content normal.         Judgment: Judgment normal.         LAB RESULTS  Recent Results (from the  past 24 hours)   Comprehensive Metabolic Panel    Collection Time: 04/26/25  3:14 PM    Specimen: Blood   Result Value Ref Range    Glucose 99 65 - 99 mg/dL    BUN 11 8 - 23 mg/dL    Creatinine 0.83 0.76 - 1.27 mg/dL    Sodium 137 136 - 145 mmol/L    Potassium 4.0 3.5 - 5.2 mmol/L    Chloride 107 98 - 107 mmol/L    CO2 22.4 22.0 - 29.0 mmol/L    Calcium 9.3 8.6 - 10.5 mg/dL    Total Protein 6.7 6.0 - 8.5 g/dL    Albumin 4.0 3.5 - 5.2 g/dL    ALT (SGPT) 12 1 - 41 U/L    AST (SGOT) 24 1 - 40 U/L    Alkaline Phosphatase 122 (H) 39 - 117 U/L    Total Bilirubin 0.4 0.0 - 1.2 mg/dL    Globulin 2.7 gm/dL    A/G Ratio 1.5 g/dL    BUN/Creatinine Ratio 13.3 7.0 - 25.0    Anion Gap 7.6 5.0 - 15.0 mmol/L    eGFR 95.3 >60.0 mL/min/1.73   Urinalysis With Microscopic If Indicated (No Culture) - Urine, Catheter    Collection Time: 04/26/25  3:14 PM    Specimen: Urine, Catheter   Result Value Ref Range    Color, UA Yellow Yellow, Straw    Appearance, UA Clear Clear    pH, UA 6.5 5.0 - 8.0    Specific Gravity, UA 1.008 1.005 - 1.030    Glucose, UA Negative Negative    Ketones, UA Negative Negative    Bilirubin, UA Negative Negative    Blood, UA Trace (A) Negative    Protein, UA Negative Negative    Leuk Esterase, UA Negative Negative    Nitrite, UA Negative Negative    Urobilinogen, UA 0.2 E.U./dL 0.2 - 1.0 E.U./dL   CBC Auto Differential    Collection Time: 04/26/25  3:14 PM    Specimen: Blood   Result Value Ref Range    WBC 6.49 3.40 - 10.80 10*3/mm3    RBC 4.62 4.14 - 5.80 10*6/mm3    Hemoglobin 14.3 13.0 - 17.7 g/dL    Hematocrit 43.5 37.5 - 51.0 %    MCV 94.2 79.0 - 97.0 fL    MCH 31.0 26.6 - 33.0 pg    MCHC 32.9 31.5 - 35.7 g/dL    RDW 12.7 12.3 - 15.4 %    RDW-SD 44.5 37.0 - 54.0 fl    MPV 10.1 6.0 - 12.0 fL    Platelets 196 140 - 450 10*3/mm3    Neutrophil % 79.9 (H) 42.7 - 76.0 %    Lymphocyte % 11.6 (L) 19.6 - 45.3 %    Monocyte % 6.9 5.0 - 12.0 %    Eosinophil % 1.1 0.3 - 6.2 %    Basophil % 0.3 0.0 - 1.5 %    Immature  Grans % 0.2 0.0 - 0.5 %    Neutrophils, Absolute 5.19 1.70 - 7.00 10*3/mm3    Lymphocytes, Absolute 0.75 0.70 - 3.10 10*3/mm3    Monocytes, Absolute 0.45 0.10 - 0.90 10*3/mm3    Eosinophils, Absolute 0.07 0.00 - 0.40 10*3/mm3    Basophils, Absolute 0.02 0.00 - 0.20 10*3/mm3    Immature Grans, Absolute 0.01 0.00 - 0.05 10*3/mm3    nRBC 0.0 0.0 - 0.2 /100 WBC   Urinalysis, Microscopic Only - Urine, Catheter    Collection Time: 04/26/25  3:14 PM    Specimen: Urine, Catheter   Result Value Ref Range    RBC, UA 3-5 (A) None Seen, 0-2 /HPF    WBC, UA 0-2 None Seen, 0-2 /HPF    Bacteria, UA None Seen None Seen /HPF    Squamous Epithelial Cells, UA None Seen None Seen, 0-2 /HPF    Hyaline Casts, UA None Seen None Seen /LPF    Methodology Automated Microscopy        RADIOLOGY  No Radiology Exams Resulted Within Past 24 Hours    MEDICATIONS GIVEN IN ER  Medications   Lidocaine HCl gel (XYLOCAINE) urethral/mucosal syringe ( Topical Given 4/26/25 1502)       ORDERS PLACED DURING THIS VISIT:  Orders Placed This Encounter   Procedures    Comprehensive Metabolic Panel    Urinalysis With Microscopic If Indicated (No Culture) - Urine, Catheter    CBC Auto Differential    Urinalysis, Microscopic Only - Urine, Clean Catch    Insert Indwelling Urinary Catheter    Assess Need for Indwelling Urinary Catheter - Follow Removal Protocol    Urinary Catheter Care    CBC & Differential       OUTPATIENT MEDICATION MANAGEMENT:  No current Epic-ordered facility-administered medications on file.     Current Outpatient Medications Ordered in Epic   Medication Sig Dispense Refill    alfuzosin (UROXATRAL) 10 MG 24 hr tablet Take 1 tablet by mouth Daily. 30 tablet 0    diclofenac (VOLTAREN) 75 MG EC tablet Take 1 tablet by mouth 2 (Two) Times a Day. As needed for moderate pain (Patient not taking: Reported on 10/2/2024)      vitamin B-12 (CYANOCOBALAMIN) 1000 MCG tablet Take 1 tablet by mouth Daily.         PROCEDURES  Procedures    PROGRESS, DATA  ANALYSIS, CONSULTS, AND MEDICAL DECISION MAKING  All labs have been independently interpreted by me.  All radiology studies have been reviewed by me. All EKG's have been independently viewed and interpreted by me.  Discussion below represents my analysis of pertinent findings related to patient's condition, differential diagnosis, treatment plan and final disposition.    Differential diagnosis includes but is not limited to urinary retention, UTI, kidney dysfunction.    Clinical Scores:                   ED Course as of 04/26/25 1551   Sat Apr 26, 2025   1502 Bladder scan showed greater than 800 cc.  Saleh catheter has been placed. [AB]   1512 Colleen, ED pharmacist, reviewed the patient's medications and insurance coverage.  She recommends Alfuzosin.  Patient has failed other alpha blockers in the past. [AB]   1547 Creatinine: 0.83 [AB]   1547 Urinalysis With Microscopic If Indicated (No Culture) - Urine, Catheter(!) [AB]   1547 WBC: 6.49 [AB]   1547 Hemoglobin: 14.3 [AB]   1549 Updated patient on results.  He states that his urology follow-up is actually a week from this Monday.  Encouraged him to call them on Monday to see if they want to see him sooner or just to keep his already established appointment.  He is agreeable to trying another medication for his prostate.  If he has an adverse reaction, he should stop it and follow-up with urology.  Patient agreeable plan and discharge. [AB]      ED Course User Index  [AB] Charli Fournier MD             AS OF 15:51 EDT VITALS:    BP - 117/73  HR - 68  TEMP -    O2 SATS - 97%    COMPLEXITY OF CARE  Admission was considered but after careful review of the patient's presentation, physical examination, diagnostic results, and response to treatment the patient may be safely discharged with outpatient follow-up.      DIAGNOSIS  Final diagnoses:   Urinary retention         DISPOSITION  ED Disposition       ED Disposition   Discharge    Condition   Stable    Comment   --                 Please note that portions of this document were completed with a voice recognition program.    Note Disclaimer: At [facility], we believe that sharing information builds trust and better relationships. You are receiving this note because you recently visited [facility]. It is possible you will see health information before a provider has talked with you about it. This kind of information can be easy to misunderstand. To help you fully understand what it means for your health, we urge you to discuss this note with your provider.         Charli Fournier MD  04/26/25 0003

## 2025-04-26 NOTE — ED NOTES
Bladder scan preformed, 899 1st time 622 2nd time.   Pt is having discomfort  Denies hx of kidney stones  Hx of enlarged prostate

## 2025-04-27 ENCOUNTER — HOSPITAL ENCOUNTER (EMERGENCY)
Facility: HOSPITAL | Age: 70
Discharge: HOME | End: 2025-04-27
Attending: EMERGENCY MEDICINE | Admitting: EMERGENCY MEDICINE
Payer: MEDICARE

## 2025-04-27 VITALS
TEMPERATURE: 97.1 F | OXYGEN SATURATION: 98 % | SYSTOLIC BLOOD PRESSURE: 117 MMHG | RESPIRATION RATE: 16 BRPM | DIASTOLIC BLOOD PRESSURE: 77 MMHG | HEART RATE: 83 BPM

## 2025-04-27 DIAGNOSIS — R30.0 DYSURIA: Primary | ICD-10-CM

## 2025-04-27 DIAGNOSIS — T83.9XXA COMPLICATION OF FOLEY CATHETER, INITIAL ENCOUNTER: ICD-10-CM

## 2025-04-27 PROCEDURE — 99283 EMERGENCY DEPT VISIT LOW MDM: CPT

## 2025-04-27 NOTE — DISCHARGE INSTRUCTIONS
[facility] next [facility] as scheduled.  Return to the emergency department for trouble urinating, blood in urine, abdominal pain, flank pain, fever, or other concern.

## 2025-04-27 NOTE — ED TRIAGE NOTES
Pt to ED with c/o catheter placed yesterday, intense pain today, urinating around cath, blood noted.

## 2025-04-27 NOTE — ED PROVIDER NOTES
" EMERGENCY DEPARTMENT ENCOUNTER    Room Number:  35/35  PCP: Cathleen Kebede APRN  Historian: Patient, spouse    I initially evaluated the patient at 12:43 PM    HPI:  Chief Complaint: Problems with Saleh catheter  A complete HPI/ROS/PMH/PSH/SH/FH are unobtainable due to: Nothing  Context: Colby Santillan is a 69 y.o. male with a medical history of BPH who presents to the ED c/o acute problems with his Saleh catheter.  He was seen here in the ED yesterday for acute urinary retention.  Bladder scan showed greater than 800 mL.  Saleh catheter was placed and left in at discharge.  Patient was given a prescription for alfuzosin.  CBC and urinalysis were unremarkable.  Renal function was normal.  Since being discharged, he has had burning pain or discomfort at the tip of his penis.  He reports that the catheter was \"pulling\" whenever he would move.  He had some urine draining from around the catheter this morning.  He noticed a small amount of pinkish blood around the catheter.  He reports that the catheter is draining normally.  He denies fever, chills, abdominal pain, flank pain, nausea, or vomiting.  He has an appointment with urology in 1 week.      PAST MEDICAL HISTORY  Active Ambulatory Problems     Diagnosis Date Noted    Conductive hearing loss of both ears 04/13/2021    Folliculitis 04/13/2021    History of colon cancer, stage III 04/13/2021    Benign essential tremor 03/28/2022    History of skin cancer 01/09/2023    Dystonic tremor 03/31/2023    B12 deficiency 05/31/2023     Resolved Ambulatory Problems     Diagnosis Date Noted    Urinary frequency 03/28/2022    Change in bowel habit 03/31/2023     Past Medical History:   Diagnosis Date    Anesthesia complication     Benign prostatic hyperplasia 01/01/2019    Cancer 12/01/2004    Colon cancer Diagnosis in December, 2004    Colon polyp 12/01/2004    Constipation     Hernia 5/1/23    History of chemotherapy     HL (hearing loss) 07/01/2000    Lactose intolerance " 2000    Rectal bleeding June, 2003    Skin cancer     Tremor 7/1/2010         PAST SURGICAL HISTORY  Past Surgical History:   Procedure Laterality Date    ABDOMINAL SURGERY  2004    resection for colon cancer    APPENDECTOMY  12/21/2004    Doctor decided to remove during cancer surgery (colon resection)    COLON SURGERY  12/21/2004    Colon resection for cancer    COLONOSCOPY  Latest: 2016    COLONOSCOPY N/A 04/25/2023    Procedure: COLONOSCOPY into cecum;  Surgeon: Jack Padilla MD;  Location: [facility] ENDOSCOPY;  Service: Gastroenterology;  Laterality: N/A;    FRACTURE SURGERY Left 01/05/1985    HARDWARE SINCE REMOVED    VASECTOMY  10/1/1994         FAMILY HISTORY  Family History   Problem Relation Age of Onset    Ovarian cancer Mother     Cancer Mother         Ovarian    Miscarriages / Stillbirths Mother     Hypertension Father         Issue has been resolved through change of diet    Prostate cancer Father     Tremor Father     Alcohol abuse Father     Anxiety disorder Father     Cancer Father         Prostate    Hearing loss Father     Tremor Brother     Diabetes Brother     Alcohol abuse Brother     Cancer Brother         Skin    Colon polyps Brother     Hearing loss Maternal Grandfather     Mental illness Maternal Grandmother     Diabetes Brother     Diabetes Brother     Colon polyps Brother          SOCIAL HISTORY  Social History     Socioeconomic History    Marital status:    Tobacco Use    Smoking status: Never    Smokeless tobacco: Never   Vaping Use    Vaping status: Never Used   Substance and Sexual Activity    Alcohol use: Yes     Alcohol/week: 1.0 standard drink of alcohol     Types: 1 Cans of beer per week     Comment: Infrequent    Drug use: Never    Sexual activity: Yes     Partners: Female     Birth control/protection: Vasectomy     Comment: Vasectomy         ALLERGIES  Flomax [tamsulosin]    REVIEW OF SYSTEMS  Review of Systems  Included in HPI  All systems reviewed and negative except  for those discussed in HPI.      PHYSICAL EXAM  ED Triage Vitals   Temp Heart Rate Resp BP SpO2   04/27/25 1209 04/27/25 1209 04/27/25 1209 04/27/25 1214 04/27/25 1209   97.1 °F (36.2 °C) 83 16 130/82 98 %      Temp src Heart Rate Source Patient Position BP Location FiO2 (%)   -- -- -- -- --              Physical Exam      GENERAL: Awake, alert, oriented x 3.  Well-developed, well-nourished elderly male.  Resting comfortably in no acute distress  HENT: NCAT, nares patent  EYES: no scleral icterus  CV: regular rhythm, normal rate  RESPIRATORY: normal effort, clear to auscultation bilaterally  ABDOMEN: soft, nondistended, nontender, no CVA tenderness  : Saleh catheter is present draining khadijah-colored urine.  There is no blood at the meatus.  There is no blood/clots in the catheter tubing or bag.  MUSCULOSKELETAL: Extremities are nontender with full range of motion  NEURO: Speech is normal.  No facial droop.  PSYCH:  calm, cooperative  SKIN: warm, dry    Vital signs and nursing notes reviewed.          LAB RESULTS  No results found for this or any previous visit (from the past 24 hours).      Ordered the above labs and reviewed the results.        RADIOLOGY  No Radiology Exams Resulted Within Past 24 Hours    Ordered the above noted radiological studies. Reviewed by me in PACS.            PROCEDURES  Procedures        OUTPATIENT MEDICATION MANAGEMENT:  No current Epic-ordered facility-administered medications on file.     Current Outpatient Medications Ordered in Epic   Medication Sig Dispense Refill    alfuzosin (UROXATRAL) 10 MG 24 hr tablet Take 1 tablet by mouth Daily. 30 tablet 0    diclofenac (VOLTAREN) 75 MG EC tablet Take 1 tablet by mouth 2 (Two) Times a Day. As needed for moderate pain (Patient not taking: Reported on 10/2/2024)      vitamin B-12 (CYANOCOBALAMIN) 1000 MCG tablet Take 1 tablet by mouth Daily.             MEDICATIONS GIVEN IN ER  Medications - No data to display                MEDICAL  DECISION MAKING, PROGRESS, and CONSULTS    All labs have been independently reviewed by me.  All radiology studies have been reviewed by me and I have also reviewed the radiology report.   EKG's independently viewed and interpreted by me.  Discussion below represents my analysis of pertinent findings related to patient's condition, differential diagnosis, treatment plan and final disposition.      Additional sources:    - Discussed/ obtained information from independent historians: Spouse at bedside    - External (non-ED) record review: CT abdomen/pelvis done in April 2023 showed a large volume of stool in the colon.  There was a right inguinal hernia.    -Prescription drug monitoring program review:     N/A    - Chronic or social conditions impacting patient care (Social Determinants of Health): None          Orders placed during this visit:  Orders Placed This Encounter   Procedures    Remove Urinary Catheter         Additional orders considered but not ordered:          Differential diagnosis includes, but is not limited to:    Saleh catheter malfunction/complication/malpositioning, UTI, urethral trauma      Independent interpretation of labs, radiology studies, and discussions with consultants:  ED Course as of 04/27/25 1715   Sun Apr 27, 2025   1243 BP: 130/82 [WH]   1243 Temp: 97.1 °F (36.2 °C) [WH]   1243 Heart Rate: 83 [WH]   1243 Resp: 16 [WH]   1243 SpO2: 98 % [WH]   1302 Catheter tubing was adjusted by LILIAN Tariq, so that there was more slack in the tubing.  Patient would like the catheter removed.  I explained that there is a chance it would have to be replaced if he is unable to void spontaneously.  He understands his but would like to have it removed now. [WH]   1455 Patient has been able to urinate without difficulty several times since the catheter was removed.  Urine was initially somewhat bloody but is now dark yellow.  He is requesting to go home.  All questions were answered.  Return precautions  were discussed.  He has an appointment with urology in 8 days. [WH]   7875 MDM: Patient presented to the ED complaining of dysuria and pain from his Saleh catheter.  He was seen here in the ED yesterday for acute urinary retention.  Saleh catheter was placed at that time.  Here in the ED, catheter was initially repositioned and his pain improved.  Per his request, catheter was then discontinued.  After this, he was able to urinate without difficulty.  He already has an appointment with urology next week.  Urinalysis done yesterday did not show any evidence of infection. [WH]      ED Course User Index  [WH] Nhan Bowling MD         COMPLEXITY OF CARE        DIAGNOSIS  Final diagnoses:   Dysuria   Complication of Saleh catheter, initial encounter         DISPOSITION  DISCHARGE    Patient discharged in stable condition.    Reviewed implications of results, diagnosis, meds, responsibility to follow up, warning signs and symptoms of possible worsening, potential complications and reasons to return to ER, including worsening/persistent symptoms, flank pain, abdominal pain, fever, vomiting, problems urinating, or other concern.    Patient/Family voiced understanding of above instructions.    Discussed plan for discharge, as there is no emergent indication for admission. Patient referred to primary care provider for BP management due to today's BP. Pt/family is agreeable and understands need for follow up and repeat testing.  Pt is aware that discharge does not mean that nothing is wrong but it indicates no emergency is present that requires admission and they must continue care with follow-up as given below or physician of their choice.     FOLLOW-UP  [facility]  [address] [facility] [address]  [phone]  Go in 8 days  As scheduled         Medication List      No changes were made to your prescriptions during this visit.                   Latest Documented Vital Signs:  AS OF 17:15 EDT  VITALS:    BP - 117/77  HR - 83  TEMP - 97.1 °F (36.2 °C)  O2 SATS - 98%            --    Please note that portions of this were completed with a voice recognition program.       Note Disclaimer: At [facility], we believe that sharing information builds trust and better relationships. You are receiving this note because you are receiving care at [facility] or recently visited. It is possible you will see health information before a provider has talked with you about it. This kind of information can be easy to misunderstand. To help you fully understand what it means for your health, we urge you to discuss this note with your provider.             Nhan Bowling MD  04/27/25 0091

## 2025-05-21 ENCOUNTER — TELEPHONE (OUTPATIENT)
Dept: INTERNAL MEDICINE | Facility: CLINIC | Age: 70
End: 2025-05-21
Payer: MEDICARE

## 2025-05-21 ENCOUNTER — OFFICE VISIT (OUTPATIENT)
Dept: INTERNAL MEDICINE | Facility: CLINIC | Age: 70
End: 2025-05-21
Payer: MEDICARE

## 2025-05-21 VITALS
SYSTOLIC BLOOD PRESSURE: 126 MMHG | RESPIRATION RATE: 18 BRPM | WEIGHT: 184.8 LBS | HEIGHT: 75 IN | BODY MASS INDEX: 22.98 KG/M2 | OXYGEN SATURATION: 97 % | HEART RATE: 62 BPM | DIASTOLIC BLOOD PRESSURE: 70 MMHG
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
## 2025-05-21 DIAGNOSIS — S80.861A TICK BITE OF RIGHT LOWER LEG, INITIAL ENCOUNTER: Primary | ICD-10-CM

## 2025-05-21 RX ORDER — CARBIDOPA AND LEVODOPA 25; 100 MG/1; MG/1
TABLET ORAL
COMMUNITY
Start: 2025-05-05 | End: 2025-06-30

## 2025-05-21 NOTE — PROGRESS NOTES
"Chief Complaint  Tick Removal (TICK BITE x2 days/OTC alcohol)    Subjective        Colby Santillan presents to [facility] PRIMARY [facility]  History of Present Illness  This is a 69-year-old male presenting after identifying a tick bite behind his right knee that may have been attached for couple days.  He did travel out of state a couple weeks ago but believes this took was acquired somewhere around his home within the state.  He has no other symptoms such as fever or joint pain.  His wife did remove the tick appropriately at home before coming in.    Received a course of doxycycline several years ago which caused severe constipation requiring medical attention and took nearly a year for his bowel habits to normalize.    Objective   Vital Signs:  /70 (BP Location: Right arm, Patient Position: Sitting, Cuff Size: Adult)   Pulse 62   Resp 18   Ht 190.5 cm (75\")   Wt 83.8 kg (184 lb 12.8 oz)   SpO2 97%   BMI 23.10 kg/m²   Estimated body mass index is 23.1 kg/m² as calculated from the following:    Height as of this encounter: 190.5 cm (75\").    Weight as of this encounter: 83.8 kg (184 lb 12.8 oz).    BMI is within normal parameters. No other follow-up for BMI required.      Physical Exam  Vitals reviewed.   Constitutional:       General: He is not in acute distress.     Appearance: Normal appearance. He is not ill-appearing or toxic-appearing.   Skin:     Comments: No visible rash on hands  Small punctate area of redness behind the right knee without drainage.   Neurological:      Mental Status: He is alert.   Psychiatric:         Mood and Affect: Mood normal.         Thought Content: Thought content normal.        Result Review :                Assessment and Plan   Diagnoses and all orders for this visit:    1. Tick bite of right lower leg, initial encounter (Primary)      Discussed with patient that Lyme disease is not endemic to this area therefore there is no need for antibiotics with " doxycycline at this time.  He will advise with any new or worsening symptoms.  He expressed comfort with plan of care.       Follow Up   Return in about 10 weeks (around 7/30/2025), or if symptoms worsen or fail to improve, for Next scheduled follow up.  Patient was given instructions and counseling regarding his condition or for health maintenance advice. Please see specific information pulled into the AVS if appropriate.

## 2025-06-28 ENCOUNTER — HOSPITAL ENCOUNTER (EMERGENCY)
Facility: HOSPITAL | Age: 70
Discharge: HOME | End: 2025-06-28
Attending: STUDENT IN AN ORGANIZED HEALTH CARE EDUCATION/TRAINING PROGRAM
Payer: MEDICARE

## 2025-06-28 VITALS
HEART RATE: 78 BPM | TEMPERATURE: 98.6 F | DIASTOLIC BLOOD PRESSURE: 80 MMHG | OXYGEN SATURATION: 98 % | WEIGHT: 175 LBS | SYSTOLIC BLOOD PRESSURE: 130 MMHG | BODY MASS INDEX: 21.76 KG/M2 | RESPIRATION RATE: 15 BRPM | HEIGHT: 75 IN

## 2025-06-28 DIAGNOSIS — R30.0 DYSURIA: Primary | ICD-10-CM

## 2025-06-28 LAB
ALBUMIN SERPL-MCNC: 4 G/DL (ref 3.5–5.2)
ALBUMIN/GLOB SERPL: 1.4 G/DL
ALP SERPL-CCNC: 149 U/L (ref 39–117)
ALT SERPL W P-5'-P-CCNC: 8 U/L (ref 1–41)
ANION GAP SERPL CALCULATED.3IONS-SCNC: 11.3 MMOL/L (ref 5–15)
AST SERPL-CCNC: 16 U/L (ref 1–40)
BACTERIA UR QL AUTO: NORMAL /HPF
BASOPHILS # BLD AUTO: 0.01 10*3/MM3 (ref 0–0.2)
BASOPHILS NFR BLD AUTO: 0.1 % (ref 0–1.5)
BILIRUB SERPL-MCNC: 0.5 MG/DL (ref 0–1.2)
BILIRUB UR QL STRIP: ABNORMAL
BUN SERPL-MCNC: 10 MG/DL (ref 8–23)
BUN/CREAT SERPL: 12 (ref 7–25)
CALCIUM SPEC-SCNC: 9.7 MG/DL (ref 8.6–10.5)
CHLORIDE SERPL-SCNC: 102 MMOL/L (ref 98–107)
CLARITY UR: CLEAR
CO2 SERPL-SCNC: 22.7 MMOL/L (ref 22–29)
COLOR UR: YELLOW
CREAT SERPL-MCNC: 0.83 MG/DL (ref 0.76–1.27)
DEPRECATED RDW RBC AUTO: 45.6 FL (ref 37–54)
EGFRCR SERPLBLD CKD-EPI 2021: 95.3 ML/MIN/1.73
EOSINOPHIL # BLD AUTO: 0.16 10*3/MM3 (ref 0–0.4)
EOSINOPHIL NFR BLD AUTO: 1.6 % (ref 0.3–6.2)
ERYTHROCYTE [DISTWIDTH] IN BLOOD BY AUTOMATED COUNT: 13 % (ref 12.3–15.4)
GLOBULIN UR ELPH-MCNC: 2.8 GM/DL
GLUCOSE SERPL-MCNC: 110 MG/DL (ref 65–99)
GLUCOSE UR STRIP-MCNC: NEGATIVE MG/DL
HCT VFR BLD AUTO: 39.1 % (ref 37.5–51)
HGB BLD-MCNC: 12.8 G/DL (ref 13–17.7)
HGB UR QL STRIP.AUTO: ABNORMAL
HOLD SPECIMEN: NORMAL
HYALINE CASTS UR QL AUTO: NORMAL /LPF
IMM GRANULOCYTES # BLD AUTO: 0.02 10*3/MM3 (ref 0–0.05)
IMM GRANULOCYTES NFR BLD AUTO: 0.2 % (ref 0–0.5)
KETONES UR QL STRIP: ABNORMAL
LEUKOCYTE ESTERASE UR QL STRIP.AUTO: NEGATIVE
LYMPHOCYTES # BLD AUTO: 0.96 10*3/MM3 (ref 0.7–3.1)
LYMPHOCYTES NFR BLD AUTO: 9.8 % (ref 19.6–45.3)
MCH RBC QN AUTO: 30.7 PG (ref 26.6–33)
MCHC RBC AUTO-ENTMCNC: 32.7 G/DL (ref 31.5–35.7)
MCV RBC AUTO: 93.8 FL (ref 79–97)
MONOCYTES # BLD AUTO: 0.85 10*3/MM3 (ref 0.1–0.9)
MONOCYTES NFR BLD AUTO: 8.7 % (ref 5–12)
NEUTROPHILS NFR BLD AUTO: 7.81 10*3/MM3 (ref 1.7–7)
NEUTROPHILS NFR BLD AUTO: 79.6 % (ref 42.7–76)
NITRITE UR QL STRIP: NEGATIVE
PH UR STRIP.AUTO: 6 [PH] (ref 5–8)
PLATELET # BLD AUTO: 204 10*3/MM3 (ref 140–450)
PMV BLD AUTO: 10.4 FL (ref 6–12)
POTASSIUM SERPL-SCNC: 4.2 MMOL/L (ref 3.5–5.2)
PROT SERPL-MCNC: 6.8 G/DL (ref 6–8.5)
PROT UR QL STRIP: ABNORMAL
RBC # BLD AUTO: 4.17 10*6/MM3 (ref 4.14–5.8)
RBC # UR STRIP: NORMAL /HPF
REF LAB TEST METHOD: NORMAL
SODIUM SERPL-SCNC: 136 MMOL/L (ref 136–145)
SP GR UR STRIP: >=1.03 (ref 1–1.03)
SQUAMOUS #/AREA URNS HPF: NORMAL /HPF
UROBILINOGEN UR QL STRIP: ABNORMAL
WBC # UR STRIP: NORMAL /HPF
WBC NRBC COR # BLD AUTO: 9.81 10*3/MM3 (ref 3.4–10.8)

## 2025-06-28 PROCEDURE — 99283 EMERGENCY DEPT VISIT LOW MDM: CPT

## 2025-06-28 PROCEDURE — 85025 COMPLETE CBC W/AUTO DIFF WBC: CPT | Performed by: STUDENT IN AN ORGANIZED HEALTH CARE EDUCATION/TRAINING PROGRAM

## 2025-06-28 PROCEDURE — 36415 COLL VENOUS BLD VENIPUNCTURE: CPT

## 2025-06-28 PROCEDURE — 87086 URINE CULTURE/COLONY COUNT: CPT | Performed by: STUDENT IN AN ORGANIZED HEALTH CARE EDUCATION/TRAINING PROGRAM

## 2025-06-28 PROCEDURE — 51798 US URINE CAPACITY MEASURE: CPT

## 2025-06-28 PROCEDURE — 81001 URINALYSIS AUTO W/SCOPE: CPT | Performed by: STUDENT IN AN ORGANIZED HEALTH CARE EDUCATION/TRAINING PROGRAM

## 2025-06-28 PROCEDURE — 80053 COMPREHEN METABOLIC PANEL: CPT | Performed by: STUDENT IN AN ORGANIZED HEALTH CARE EDUCATION/TRAINING PROGRAM
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
## 2025-06-30 LAB — BACTERIA SPEC AEROBE CULT: NO GROWTH

## 2025-07-30 ENCOUNTER — OFFICE VISIT (OUTPATIENT)
Dept: INTERNAL MEDICINE | Facility: CLINIC | Age: 70
End: 2025-07-30
Payer: MEDICARE

## 2025-07-30 VITALS
SYSTOLIC BLOOD PRESSURE: 134 MMHG | WEIGHT: 180 LBS | HEIGHT: 75 IN | DIASTOLIC BLOOD PRESSURE: 84 MMHG | OXYGEN SATURATION: 97 % | HEART RATE: 61 BPM | BODY MASS INDEX: 22.38 KG/M2

## 2025-07-30 DIAGNOSIS — G24.8 FOCAL DYSTONIA: Chronic | ICD-10-CM

## 2025-07-30 DIAGNOSIS — Z00.00 MEDICARE ANNUAL WELLNESS VISIT, SUBSEQUENT: Primary | ICD-10-CM

## 2025-07-30 DIAGNOSIS — E78.5 DYSLIPIDEMIA: Chronic | ICD-10-CM

## 2025-07-30 DIAGNOSIS — E53.8 B12 DEFICIENCY: Chronic | ICD-10-CM

## 2025-07-30 DIAGNOSIS — Z00.00 ANNUAL PHYSICAL EXAM: ICD-10-CM

## 2025-07-30 LAB
BASOPHILS # BLD AUTO: 0.04 10*3/MM3 (ref 0–0.2)
BASOPHILS NFR BLD AUTO: 0.6 % (ref 0–1.5)
CHOLEST SERPL-MCNC: 159 MG/DL (ref 0–200)
EOSINOPHIL # BLD AUTO: 0.09 10*3/MM3 (ref 0–0.4)
EOSINOPHIL NFR BLD AUTO: 1.4 % (ref 0.3–6.2)
ERYTHROCYTE [DISTWIDTH] IN BLOOD BY AUTOMATED COUNT: 12.6 % (ref 12.3–15.4)
HCT VFR BLD AUTO: 43.1 % (ref 37.5–51)
HDLC SERPL-MCNC: 60 MG/DL (ref 40–60)
HGB BLD-MCNC: 14.6 G/DL (ref 13–17.7)
IMM GRANULOCYTES # BLD AUTO: 0.01 10*3/MM3 (ref 0–0.05)
IMM GRANULOCYTES NFR BLD AUTO: 0.2 % (ref 0–0.5)
LDLC SERPL CALC-MCNC: 79 MG/DL (ref 0–100)
LYMPHOCYTES # BLD AUTO: 1.2 10*3/MM3 (ref 0.7–3.1)
LYMPHOCYTES NFR BLD AUTO: 19 % (ref 19.6–45.3)
MCH RBC QN AUTO: 31.2 PG (ref 26.6–33)
MCHC RBC AUTO-ENTMCNC: 33.9 G/DL (ref 31.5–35.7)
MCV RBC AUTO: 92.1 FL (ref 79–97)
MONOCYTES # BLD AUTO: 0.42 10*3/MM3 (ref 0.1–0.9)
MONOCYTES NFR BLD AUTO: 6.6 % (ref 5–12)
NEUTROPHILS # BLD AUTO: 4.57 10*3/MM3 (ref 1.7–7)
NEUTROPHILS NFR BLD AUTO: 72.2 % (ref 42.7–76)
NRBC BLD AUTO-RTO: 0 /100 WBC (ref 0–0.2)
PLATELET # BLD AUTO: 189 10*3/MM3 (ref 140–450)
RBC # BLD AUTO: 4.68 10*6/MM3 (ref 4.14–5.8)
TRIGL SERPL-MCNC: 109 MG/DL (ref 0–150)
TSH SERPL DL<=0.005 MIU/L-ACNC: 1.47 UIU/ML (ref 0.27–4.2)
VIT B12 SERPL-MCNC: 391 PG/ML (ref 211–946)
VLDLC SERPL CALC-MCNC: 20 MG/DL (ref 5–40)
WBC # BLD AUTO: 6.33 10*3/MM3 (ref 3.4–10.8)

## 2025-07-30 PROCEDURE — G2211 COMPLEX E/M VISIT ADD ON: HCPCS | Performed by: NURSE PRACTITIONER

## 2025-07-30 PROCEDURE — G0439 PPPS, SUBSEQ VISIT: HCPCS | Performed by: NURSE PRACTITIONER

## 2025-07-30 PROCEDURE — 1160F RVW MEDS BY RX/DR IN RCRD: CPT | Performed by: NURSE PRACTITIONER

## 2025-07-30 PROCEDURE — 99214 OFFICE O/P EST MOD 30 MIN: CPT | Performed by: NURSE PRACTITIONER

## 2025-07-30 PROCEDURE — 1159F MED LIST DOCD IN RCRD: CPT | Performed by: NURSE PRACTITIONER

## 2025-07-30 PROCEDURE — 99397 PER PM REEVAL EST PAT 65+ YR: CPT | Performed by: NURSE PRACTITIONER

## 2025-07-30 PROCEDURE — 1126F AMNT PAIN NOTED NONE PRSNT: CPT | Performed by: NURSE PRACTITIONER

## 2025-07-30 NOTE — PROGRESS NOTES
Subjective   The ABCs of the Annual Wellness Visit  Medicare Wellness Visit      Colby Santillan is a 69 y.o. patient who presents for a Medicare Wellness Visit.    The following portions of the patient's history were reviewed and   updated as appropriate: allergies, current medications, past family history, past medical history, past social history, past surgical history, and problem list.    Compared to one year ago, the patient's physical   health is worse.  Compared to one year ago, the patient's mental   health is worse.    Recent Hospitalizations:  He was not admitted to the hospital during the last year.     Current Medical Providers:  Patient Care Team:  Cathleen Kebede APRN as PCP - General (Internal Medicine)  Prudence Davis MD (Psychiatry)  Luna Way MD as Consulting Physician (Dermatology)  Jack Padilla MD as Surgeon (General Surgery)    Outpatient Medications Prior to Visit   Medication Sig Dispense Refill    carbidopa-levodopa (SINEMET)  MG per tablet Take  by mouth. (Patient taking differently: Take  by mouth 3 (Three) Times a Day.)      vitamin B-12 (CYANOCOBALAMIN) 1000 MCG tablet Take 1 tablet by mouth Daily.       No facility-administered medications prior to visit.     No opioid medication identified on active medication list. I have reviewed chart for other potential  high risk medication/s and harmful drug interactions in the elderly.      Aspirin is not on active medication list.  Aspirin use is not indicated based on review of current medical condition/s. Risk of harm outweighs potential benefits.  .    Patient Active Problem List   Diagnosis    Conductive hearing loss of both ears    Folliculitis    History of colon cancer, stage III    Benign essential tremor    History of skin cancer    Dystonic tremor    B12 deficiency     Advance Care Planning Advance Directive is not on file.  ACP discussion was held with the patient during this visit. Patient does not have an  "advance directive, information provided.            Objective   Vitals:    25 1441   BP: 134/84   BP Location: Left arm   Patient Position: Sitting   Cuff Size: Adult   Pulse: 61   SpO2: 97%   Weight: 81.6 kg (180 lb)   Height: 190.5 cm (75\")   PainSc: 0-No pain       Estimated body mass index is 22.5 kg/m² as calculated from the following:    Height as of this encounter: 190.5 cm (75\").    Weight as of this encounter: 81.6 kg (180 lb).    BMI is within normal parameters. No other follow-up for BMI required.           Does the patient have evidence of cognitive impairment? No                                                                                                Health  Risk Assessment    Smoking Status:  Social History     Tobacco Use   Smoking Status Never   Smokeless Tobacco Never     Alcohol Consumption:  Social History     Substance and Sexual Activity   Alcohol Use Not Currently    Alcohol/week: 1.0 standard drink of alcohol    Types: 1 Cans of beer per week       Fall Risk Screen  AYAHADI Fall Risk Assessment was completed, and patient is at LOW risk for falls.Assessment completed on:2025    Depression Screening   Little interest or pleasure in doing things? Not at all   Feeling down, depressed, or hopeless? Several days   PHQ-2 Total Score 1      Health Habits and Functional and Cognitive Screenin/28/2025    12:15 PM   Functional & Cognitive Status   Do you have difficulty preparing food and eating? No   Do you have difficulty bathing yourself, getting dressed or grooming yourself? No   Do you have difficulty using the toilet? No   Do you have difficulty moving around from place to place? No   Do you have trouble with steps or getting out of a bed or a chair? No   Current Diet Well Balanced Diet   Dental Exam Up to date   Eye Exam Up to date   Exercise (times per week) 3 times per week   Current Exercises Include Gardening;Walking;Weightlifting;Yard Work   Do you need help using the " phone?  No   Are you deaf or do you have serious difficulty hearing?  No   Do you need help to go to places out of walking distance? No   Do you need help shopping? No   Do you need help preparing meals?  No   Do you need help with housework?  No   Do you need help with laundry? No   Do you need help taking your medications? No   Do you need help managing money? No   Do you ever drive or ride in a car without wearing a seat belt? No   Have you felt unusual fatigue (could be tiredness), stress, anger or loneliness in the last month? No   Who do you live with? Spouse   If you need help, do you have trouble finding someone available to you? No   Have you been bothered in the last four weeks by sexual problems? No   Do you have difficulty concentrating, remembering or making decisions? No           Age-appropriate Screening Schedule:  Refer to the list below for future screening recommendations based on patient's age, sex and/or medical conditions. Orders for these recommended tests are listed in the plan section. The patient has been provided with a written plan.    Health Maintenance List  Health Maintenance   Topic Date Due    ZOSTER VACCINE (1 of 2) 08/13/2025 (Originally 11/26/2006)    Pneumococcal Vaccine 50+ (1 of 1 - PCV) 08/28/2025 (Originally 11/26/2006)    COVID-19 Vaccine (5 - 2024-25 season) 01/30/2026 (Originally 9/1/2024)    ANNUAL WELLNESS VISIT  07/30/2026    COLORECTAL CANCER SCREENING  04/25/2028    TDAP/TD VACCINES (2 - Tdap) 07/09/2032    HEPATITIS C SCREENING  Completed    INFLUENZA VACCINE  Discontinued                                                                                                                                                CMS Preventative Services Quick Reference  Risk Factors Identified During Encounter  Immunizations Discussed/Encouraged: Influenza, Shingrix, and COVID19  Dental Screening Recommended  Vision Screening Recommended    The above risks/problems have been  "discussed with the patient.  Pertinent information has been shared with the patient in the After Visit Summary.  An After Visit Summary and PPPS were made available to the patient.    Follow Up:   Next Medicare Wellness visit to be scheduled in 1 year.         Additional E&M Note during same encounter follows:  Patient has additional, significant, and separately identifiable condition(s)/problem(s) that require work above and beyond the Medicare Wellness Visit     Chief Complaint  Medicare Wellness-subsequent    Subjective   HPI  Colby is also being seen today for an annual adult preventative physical exam.  and Colby is also being seen today for additional medical problem/s.    Following with [facility] for hx of BPH with LUTS  S/p prostatic artery embolization  Last PSA 4.64  Reports great improvement with PAE    Following with Dr Susan COOPER neuro for hx focal dystonia-- on sinemet; did not improve on botox injections. Next f/u 8/6/25; he repots he is still struggling symptomatically from this and notices symptoms worsening sleep at night.       Objective   Vital Signs:  /84 (BP Location: Left arm, Patient Position: Sitting, Cuff Size: Adult)   Pulse 61   Ht 190.5 cm (75\")   Wt 81.6 kg (180 lb)   SpO2 97%   BMI 22.50 kg/m²   Physical Exam  Constitutional:       Appearance: Normal appearance.   HENT:      Head: Normocephalic and atraumatic.      Right Ear: External ear normal.      Left Ear: External ear normal.      Nose: Nose normal.      Mouth/Throat:      Mouth: Mucous membranes are moist.      Pharynx: Oropharynx is clear.   Eyes:      Conjunctiva/sclera: Conjunctivae normal.      Pupils: Pupils are equal, round, and reactive to light.   Cardiovascular:      Rate and Rhythm: Normal rate and regular rhythm.      Pulses: Normal pulses.      Heart sounds: Normal heart sounds. No murmur heard.     No gallop.   Pulmonary:      Effort: Pulmonary effort is normal. No respiratory distress.      Breath sounds: " Normal breath sounds. No stridor. No wheezing, rhonchi or rales.   Musculoskeletal:      Cervical back: Normal range of motion.   Neurological:      Mental Status: He is alert. Mental status is at baseline.   Psychiatric:         Mood and Affect: Mood normal.         The following data was reviewed by: JAMES Romero on 07/30/2025:    Common labs          4/7/2025    12:13 4/26/2025    15:14 6/28/2025    22:16   Common Labs   Glucose  99  110    BUN  11  10.0    Creatinine  0.83  0.83    Sodium  137  136    Potassium  4.0  4.2    Chloride  107  102    Calcium  9.3  9.7    Albumin  4.0  4.0    Total Bilirubin  0.4  0.5    Alkaline Phosphatase  122  149    AST (SGOT)  24  16    ALT (SGPT)  12  8    WBC  6.49  9.81    Hemoglobin  14.3  12.8    Hematocrit  43.5  39.1    Platelets  196  204    PSA 4.64            Details          This result is from an external source.             Tobacco Use: Low Risk  (7/30/2025)    Patient History     Smoking Tobacco Use: Never     Smokeless Tobacco Use: Never     Passive Exposure: Not on file     Social History     Substance and Sexual Activity   Alcohol Use Not Currently    Alcohol/week: 1.0 standard drink of alcohol    Types: 1 Cans of beer per week     Family History   Problem Relation Age of Onset    Ovarian cancer Mother     Cancer Mother         Ovarian    Miscarriages / Stillbirths Mother     Hypertension Father         Issue has been resolved through change of diet    Prostate cancer Father     Tremor Father     Alcohol abuse Father     Anxiety disorder Father     Cancer Father         Prostate    Hearing loss Father     Tremor Brother     Diabetes Brother     Alcohol abuse Brother     Cancer Brother         Skin    Colon polyps Brother     Hearing loss Maternal Grandfather     Mental illness Maternal Grandmother     Diabetes Brother     Diabetes Brother     Colon polyps Brother           Assessment and Plan Additional age appropriate preventative wellness advice topics  were discussed during today's preventative wellness exam(some topics already addressed during AWV portion of the note above):    Physical Activity: Advised cardiovascular activity 150 minutes per week as tolerated. (example brisk walk for 30 minutes, 5 days a week).     Nutrition: Discussed nutrition plan with patient. Information shared in after visit summary. Goal is for a well balanced diet to enhance overall health.     Healthy Weight: Discussed current and goal BMI with patient. Steps to attain this goal discussed. Information shared in after visit summary.     Medicare annual wellness visit, subsequent         Annual physical exam  Current recommendations according to the current Physical Activity Guidelines for Americans: adults need 150-300 minutes of physical exercise weekly. It is also recommended to perform two sessions of full body strength training exercise weekly which includes all major muscle groups including legs, hips, back, abdomen, chest, shoulders, and arms.   Current CDC recommendations for diet include following a diet that emphasizes fruits, vegetables, whole grains that is low in saturated fats and low in sugar intake.   Adults should consume at least 3 cup equivalents of fruit and vegetables daily. It is also beneficial to get 25 grams of fiber daily unless told otherwise by your healthcare provider.   Labs today   Labs reviewed  Colonoscopy UTD  Anticipatory guidance given regarding health prevention/wellness, diet/exercise, tobacco/alcohol/drug education, exercise and wellbeing, vaccination recommendations, and sexual health/STD education.   Recommended bi-yearly dental exams and regular vision examinations.          Focal dystonia  Continues following with [facility]  On sinemet  Pending f/u 8/6  Orders:    CBC & Differential    B12 deficiency  On b12 supplementation-- reports occasionally forgetting  Lab Results   Component Value Date    [id number] 327 07/17/2024       Orders:     Vitamin B12    Dyslipidemia  Recommend following a low saturated fat, low sugar diet and getting 150 minutes of weekly exercise.   The 10-year ASCVD risk score (Christiano XAVIER, et al., 2019) is: 13.7%    Values used to calculate the score:      Age: 69 years      Sex: Male      Is Non- : No      Diabetic: No      Tobacco smoker: No      Systolic Blood Pressure: 134 mmHg      Is BP treated: No      HDL Cholesterol: 55 mg/dL      Total Cholesterol: 149 mg/dL    Orders:    TSH Rfx On Abnormal To Free T4    Lipid panel            Follow Up   Return in about 1 year (around 7/30/2026) for Medicare Wellness.  Patient was given instructions and counseling regarding his condition or for health maintenance advice. Please see specific information pulled into the AVS if appropriate.

## 2025-07-30 NOTE — ASSESSMENT & PLAN NOTE
Current recommendations according to the current Physical Activity Guidelines for Americans: adults need 150-300 minutes of physical exercise weekly. It is also recommended to perform two sessions of full body strength training exercise weekly which includes all major muscle groups including legs, hips, back, abdomen, chest, shoulders, and arms.   Current CDC recommendations for diet include following a diet that emphasizes fruits, vegetables, whole grains that is low in saturated fats and low in sugar intake.   Adults should consume at least 3 cup equivalents of fruit and vegetables daily. It is also beneficial to get 25 grams of fiber daily unless told otherwise by your healthcare provider.   Labs today   Labs reviewed  Colonoscopy UTD  Anticipatory guidance given regarding health prevention/wellness, diet/exercise, tobacco/alcohol/drug education, exercise and wellbeing, vaccination recommendations, and sexual health/STD education.   Recommended bi-yearly dental exams and regular vision examinations.

## 2025-07-30 NOTE — ASSESSMENT & PLAN NOTE
On b12 supplementation-- reports occasionally forgetting  Lab Results   Component Value Date    [id number] 327 07/17/2024       Orders:    Vitamin B12

## (undated) DEVICE — SENSR O2 OXIMAX FNGR A/ 18IN NONSTR

## (undated) DEVICE — KT ORCA ORCAPOD DISP STRL

## (undated) DEVICE — ADAPT CLN BIOGUARD AIR/H2O DISP

## (undated) DEVICE — TUBING, SUCTION, 1/4" X 10', STRAIGHT: Brand: MEDLINE

## (undated) DEVICE — LN SMPL CO2 SHTRM SD STREAM W/M LUER

## (undated) DEVICE — CANN O2 ETCO2 FITS ALL CONN CO2 SMPL A/ 7IN DISP LF